# Patient Record
Sex: FEMALE | Race: BLACK OR AFRICAN AMERICAN | NOT HISPANIC OR LATINO | URBAN - METROPOLITAN AREA
[De-identification: names, ages, dates, MRNs, and addresses within clinical notes are randomized per-mention and may not be internally consistent; named-entity substitution may affect disease eponyms.]

---

## 2021-04-21 ENCOUNTER — EMERGENCY (EMERGENCY)
Facility: HOSPITAL | Age: 26
LOS: 1 days | Discharge: HOME | End: 2021-04-21
Attending: EMERGENCY MEDICINE | Admitting: EMERGENCY MEDICINE
Payer: COMMERCIAL

## 2021-04-21 VITALS
RESPIRATION RATE: 16 BRPM | DIASTOLIC BLOOD PRESSURE: 84 MMHG | TEMPERATURE: 98 F | OXYGEN SATURATION: 100 % | SYSTOLIC BLOOD PRESSURE: 145 MMHG | HEART RATE: 110 BPM

## 2021-04-21 DIAGNOSIS — K46.9 UNSPECIFIED ABDOMINAL HERNIA WITHOUT OBSTRUCTION OR GANGRENE: Chronic | ICD-10-CM

## 2021-04-21 PROCEDURE — 99283 EMERGENCY DEPT VISIT LOW MDM: CPT

## 2021-04-21 RX ORDER — SIMETHICONE 80 MG/1
80 TABLET, CHEWABLE ORAL ONCE
Refills: 0 | Status: COMPLETED | OUTPATIENT
Start: 2021-04-21 | End: 2021-04-21

## 2021-04-21 RX ADMIN — Medication 0.5 MILLIGRAM(S): at 23:07

## 2021-04-21 RX ADMIN — SIMETHICONE 80 MILLIGRAM(S): 80 TABLET, CHEWABLE ORAL at 23:08

## 2021-04-21 NOTE — ED PROVIDER NOTE - CLINICAL SUMMARY MEDICAL DECISION MAKING FREE TEXT BOX
26yoF with throat discomfort and mucus, no exam findings. After lengthy discussion it Is clear patient is anxious & felt better after talking, constant burping noticed. Pt plans to seek therapy. Denies SI, plan for now symethecon for burping and 0.5 adevan for anxiety. 26yoF with throat discomfort and mucus, no exam findings. After lengthy discussion it Is clear patient is anxious & felt better after talking, constant burping noticed. Pt plans to seek therapy. Denies SI, plan for now simethicone for burping and 0.5 ativan for anxiety. 26yoF with throat discomfort and mucous, no exam findings. After lengthy discussion it Is clear patient is anxious & felt better after talking, constant burping noticed. Pt plans to seek therapy. Denies SI, plan for now simethicone for burping and 0.5 ativan for anxiety.

## 2021-04-21 NOTE — ED PROVIDER NOTE - PATIENT PORTAL LINK FT
You can access the FollowMyHealth Patient Portal offered by [facility] by registering at the following website: [email or web address]. By joining [employer]’s FollowMyHealth portal, you will also be able to view your health information using other applications (apps) compatible with our system.

## 2021-04-21 NOTE — ED PROVIDER NOTE - NSFOLLOWUPCLINICS_GEN_ALL_ED_FT
[facility]  Behavioral Health  [address] [place] [address]  Phone: [phone]  Fax:

## 2021-04-21 NOTE — ED PROVIDER NOTE - NS_ ATTENDINGSCRIBEDETAILS _ED_A_ED_FT
I,Melia Kenyon, performed the initial face to face bedside interview with this patient regarding history of present illness, review of symptoms and relevant past medical, social and family history.  I completed an independent physical examination.  I was the initial provider who evaluated this patient. The history, relevant review of systems, past medical and surgical history, medical decision making, and physical examination was documented by the scribe in my presence and I attest to the accuracy of the documentation.

## 2021-04-21 NOTE — ED PROVIDER NOTE - NSFOLLOWUPINSTRUCTIONS_ED_ALL_ED_FT
Per our discussion, please follow up with the crisis center as needed. You may find a therapist on [email or web address] as well. Return to the emergency department if you have worsening symptoms, can't tolerate anything to eat or drink or new symptoms of concern.

## 2021-04-21 NOTE — ED ADULT TRIAGE NOTE - CHIEF COMPLAINT QUOTE
pt arrives c/o mucous to throat x 3 weeks, went to urg care and given decadron with no relief, no resp distress noted

## 2021-04-21 NOTE — ED PROVIDER NOTE - OBJECTIVE STATEMENT
26yof with pmhx of reflux presents to ED for eval for mucus in the throat and constant burping, pt also states she has anxiety. Her PCP prescribed her famotidine. Pt went to the UC today because she did not feel well, she received a shot of deckadron and benadryl, and came to the ED for further evaluation. Pt denies throat pain, fevers, chills, abdominal pain, urinary symptoms. Pt is anxious and tearfrom and states its due to her job, she says she hasn't been able to eat for 3 weeks for the fear of an allergic reaction; denies hx of allergic reaction. 26yof with pmhx of reflux presents to ED for eval for mucous in the throat and constant burping, pt also states she has anxiety. Her PCP prescribed her famotidine. Pt went to the UC today because she did not feel well, she received a shot of deckadron and benadryl, and came to the ED for further evaluation. Pt denies throat pain, fevers, chills, abdominal pain, urinary symptoms. Pt is anxious and tearful and states she has a lot of stress due to her job, she says she hasn't been able to eat for 3 weeks for the fear of an allergic reaction; denies hx of allergic reaction. Patient previously saw a GI doctor who recommended she try famotidine. She has an appointment with an allergist tomorrow.

## 2021-04-28 ENCOUNTER — INPATIENT (INPATIENT)
Facility: HOSPITAL | Age: 26
LOS: 1 days | Discharge: HOME | End: 2021-04-30
Attending: INTERNAL MEDICINE | Admitting: INTERNAL MEDICINE
Payer: COMMERCIAL

## 2021-04-28 VITALS
SYSTOLIC BLOOD PRESSURE: 122 MMHG | OXYGEN SATURATION: 100 % | RESPIRATION RATE: 18 BRPM | DIASTOLIC BLOOD PRESSURE: 61 MMHG | TEMPERATURE: 100 F | HEART RATE: 122 BPM

## 2021-04-28 DIAGNOSIS — K46.9 UNSPECIFIED ABDOMINAL HERNIA WITHOUT OBSTRUCTION OR GANGRENE: Chronic | ICD-10-CM

## 2021-04-28 LAB
ALBUMIN SERPL ELPH-MCNC: 4.2 G/DL — SIGNIFICANT CHANGE UP (ref 3.3–5)
ALP SERPL-CCNC: 53 U/L — SIGNIFICANT CHANGE UP (ref 40–120)
ALT FLD-CCNC: 9 U/L — SIGNIFICANT CHANGE UP (ref 4–33)
ANION GAP SERPL CALC-SCNC: 20 MMOL/L — HIGH (ref 7–14)
APPEARANCE UR: ABNORMAL
AST SERPL-CCNC: 9 U/L — SIGNIFICANT CHANGE UP (ref 4–32)
BACTERIA # UR AUTO: ABNORMAL
BASOPHILS # BLD AUTO: 0.05 K/UL — SIGNIFICANT CHANGE UP (ref 0–0.2)
BASOPHILS NFR BLD AUTO: 0.2 % — SIGNIFICANT CHANGE UP (ref 0–2)
BILIRUB SERPL-MCNC: 1.4 MG/DL — HIGH (ref 0.2–1.2)
BILIRUB UR-MCNC: NEGATIVE — SIGNIFICANT CHANGE UP
BLOOD GAS VENOUS COMPREHENSIVE RESULT: SIGNIFICANT CHANGE UP
BUN SERPL-MCNC: 15 MG/DL — SIGNIFICANT CHANGE UP (ref 7–23)
CALCIUM SERPL-MCNC: 9.1 MG/DL — SIGNIFICANT CHANGE UP (ref 8.4–10.5)
CHLORIDE SERPL-SCNC: 99 MMOL/L — SIGNIFICANT CHANGE UP (ref 98–107)
CO2 SERPL-SCNC: 18 MMOL/L — LOW (ref 22–31)
COLOR SPEC: ABNORMAL
CREAT SERPL-MCNC: 0.88 MG/DL — SIGNIFICANT CHANGE UP (ref 0.5–1.3)
DIFF PNL FLD: ABNORMAL
EOSINOPHIL # BLD AUTO: 0 K/UL — SIGNIFICANT CHANGE UP (ref 0–0.5)
EOSINOPHIL NFR BLD AUTO: 0 % — SIGNIFICANT CHANGE UP (ref 0–6)
EPI CELLS # UR: 2 /HPF — SIGNIFICANT CHANGE UP (ref 0–5)
GLUCOSE SERPL-MCNC: 74 MG/DL — SIGNIFICANT CHANGE UP (ref 70–99)
GLUCOSE UR QL: NEGATIVE — SIGNIFICANT CHANGE UP
HCT VFR BLD CALC: 33.9 % — LOW (ref 34.5–45)
HGB BLD-MCNC: 10.8 G/DL — LOW (ref 11.5–15.5)
IANC: 18.38 K/UL — HIGH (ref 1.5–8.5)
IMM GRANULOCYTES NFR BLD AUTO: 0.6 % — SIGNIFICANT CHANGE UP (ref 0–1.5)
KETONES UR-MCNC: ABNORMAL
LEUKOCYTE ESTERASE UR-ACNC: ABNORMAL
LIDOCAIN IGE QN: 45 U/L — SIGNIFICANT CHANGE UP (ref 7–60)
LYMPHOCYTES # BLD AUTO: 0.36 K/UL — LOW (ref 1–3.3)
LYMPHOCYTES # BLD AUTO: 1.8 % — LOW (ref 13–44)
MCHC RBC-ENTMCNC: 28.6 PG — SIGNIFICANT CHANGE UP (ref 27–34)
MCHC RBC-ENTMCNC: 31.9 GM/DL — LOW (ref 32–36)
MCV RBC AUTO: 89.9 FL — SIGNIFICANT CHANGE UP (ref 80–100)
MONOCYTES # BLD AUTO: 1.23 K/UL — HIGH (ref 0–0.9)
MONOCYTES NFR BLD AUTO: 6.1 % — SIGNIFICANT CHANGE UP (ref 2–14)
NEUTROPHILS # BLD AUTO: 18.38 K/UL — HIGH (ref 1.8–7.4)
NEUTROPHILS NFR BLD AUTO: 91.3 % — HIGH (ref 43–77)
NITRITE UR-MCNC: NEGATIVE — SIGNIFICANT CHANGE UP
NRBC # BLD: 0 /100 WBCS — SIGNIFICANT CHANGE UP
NRBC # FLD: 0 K/UL — SIGNIFICANT CHANGE UP
PH UR: 5.5 — SIGNIFICANT CHANGE UP (ref 5–8)
PLATELET # BLD AUTO: 223 K/UL — SIGNIFICANT CHANGE UP (ref 150–400)
POTASSIUM SERPL-MCNC: 3.8 MMOL/L — SIGNIFICANT CHANGE UP (ref 3.5–5.3)
POTASSIUM SERPL-SCNC: 3.8 MMOL/L — SIGNIFICANT CHANGE UP (ref 3.5–5.3)
PROT SERPL-MCNC: 6.9 G/DL — SIGNIFICANT CHANGE UP (ref 6–8.3)
PROT UR-MCNC: ABNORMAL
RBC # BLD: 3.77 M/UL — LOW (ref 3.8–5.2)
RBC # FLD: 14.7 % — HIGH (ref 10.3–14.5)
RBC CASTS # UR COMP ASSIST: >10 /HPF — HIGH (ref 0–4)
SARS-COV-2 RNA SPEC QL NAA+PROBE: SIGNIFICANT CHANGE UP
SODIUM SERPL-SCNC: 137 MMOL/L — SIGNIFICANT CHANGE UP (ref 135–145)
SP GR SPEC: 1.03 — HIGH (ref 1.01–1.02)
UROBILINOGEN FLD QL: SIGNIFICANT CHANGE UP
WBC # BLD: 20.14 K/UL — HIGH (ref 3.8–10.5)
WBC # FLD AUTO: 20.14 K/UL — HIGH (ref 3.8–10.5)
WBC UR QL: 5 /HPF — SIGNIFICANT CHANGE UP (ref 0–5)

## 2021-04-28 PROCEDURE — 74176 CT ABD & PELVIS W/O CONTRAST: CPT | Mod: 26

## 2021-04-28 PROCEDURE — 99285 EMERGENCY DEPT VISIT HI MDM: CPT

## 2021-04-28 PROCEDURE — 76700 US EXAM ABDOM COMPLETE: CPT | Mod: 26

## 2021-04-28 PROCEDURE — 71046 X-RAY EXAM CHEST 2 VIEWS: CPT | Mod: 26

## 2021-04-28 RX ORDER — FAMOTIDINE 10 MG/ML
20 INJECTION INTRAVENOUS DAILY
Refills: 0 | Status: DISCONTINUED | OUTPATIENT
Start: 2021-04-28 | End: 2021-04-28

## 2021-04-28 RX ORDER — SODIUM CHLORIDE 9 MG/ML
1000 INJECTION INTRAMUSCULAR; INTRAVENOUS; SUBCUTANEOUS ONCE
Refills: 0 | Status: COMPLETED | OUTPATIENT
Start: 2021-04-28 | End: 2021-04-28

## 2021-04-28 RX ORDER — PIPERACILLIN AND TAZOBACTAM 4; .5 G/20ML; G/20ML
3.38 INJECTION, POWDER, LYOPHILIZED, FOR SOLUTION INTRAVENOUS ONCE
Refills: 0 | Status: COMPLETED | OUTPATIENT
Start: 2021-04-28 | End: 2021-04-28

## 2021-04-28 RX ORDER — ACETAMINOPHEN 500 MG
975 TABLET ORAL ONCE
Refills: 0 | Status: COMPLETED | OUTPATIENT
Start: 2021-04-28 | End: 2021-04-28

## 2021-04-28 RX ADMIN — PIPERACILLIN AND TAZOBACTAM 200 GRAM(S): 4; .5 INJECTION, POWDER, LYOPHILIZED, FOR SOLUTION INTRAVENOUS at 17:38

## 2021-04-28 RX ADMIN — Medication 30 MILLILITER(S): at 16:30

## 2021-04-28 RX ADMIN — SODIUM CHLORIDE 1000 MILLILITER(S): 9 INJECTION INTRAMUSCULAR; INTRAVENOUS; SUBCUTANEOUS at 17:38

## 2021-04-28 RX ADMIN — SODIUM CHLORIDE 1000 MILLILITER(S): 9 INJECTION INTRAMUSCULAR; INTRAVENOUS; SUBCUTANEOUS at 16:22

## 2021-04-28 NOTE — ED PROVIDER NOTE - ATTENDING CONTRIBUTION TO CARE
DR. GARG, ATTENDING MD-  I performed a face to face bedside interview with the patient regarding history of present illness, review of symptoms and past medical history. I completed an independent physical exam.  I have discussed the patient's plan of care with the PA.   Documentation as above in the note.    27 y/o female h/o anemia, ing hernia repair here with abd pain s/p endoscopy this morning.  Pt is tachy, ttp epigastrium.  Eval for lyte abn, anemia, dehydration, pancreatitis, gb pathology, gastritis, pregnancy.  Obtain [facility] covid swab cbc cmp lipase ruq u/s give ivf gi cocktail reassess.

## 2021-04-28 NOTE — ED ADULT NURSE NOTE - OBJECTIVE STATEMENT
Pt with co epigastric pain with nausea  not eating well. pt given maalox ,pt refuse pepcid no vomiting noted. fluids given awaiting dipo.

## 2021-04-28 NOTE — ED PROVIDER NOTE - EYES, MLM
Discussed sleep hygiene. Recommended sleep meditation.   Clear bilaterally, pupils equal, round and reactive to light.

## 2021-04-28 NOTE — ED PROVIDER NOTE - PROGRESS NOTE DETAILS
ADRIENNE:  Patient signed out to me by Dr. Hammonds pending ctap.  Patient declining iv contrast, reporting family history of IV contrast allergy.  Patient denies personal history of IV contrast allergy.  I explained to patient that a CT without contrast could miss clinically important pathology such as infection or inflammatory pathology that may require surgery and may place the patient at risk of serious illness. Patient acknowledged risk and was not altered or intoxicated appearing.  Will proceed with non contrast ct of abdomen per patient's wishes. DELEON:  CT negative, although limited due to no iv contrast.  Patient labs c/w starvation ketoacidosis.  Will admit for leukocytosis and supportive care.

## 2021-04-28 NOTE — ED PROVIDER NOTE - CLINICAL SUMMARY MEDICAL DECISION MAKING FREE TEXT BOX
27 Y/O F PMH Anemia never transfused C/O limited PO intake for the past 7 days now presents with nausea and lightheadedness. Pt states she had an endoscopy today and has been feeling bloated after. Plan is labs to eval for anemia or electrolyte disturbance and a lipase to eval for pancreatitis. Will check an upright CXR to R/O free air, low clinical suspicion for perforation. No signs of acute distress.

## 2021-04-28 NOTE — ED PROVIDER NOTE - OBJECTIVE STATEMENT
27 Y/O F PMH Anemia never transfused C/O limited PO intake for the past 7 days now presents with nausea and lightheadedness. Pt states she had an endoscopy today and has been feeling bloated after. Pt states she was previously lightheaded but feels better after fluids (1L with EMS). Pt states she had a hiatal hernia and gastritis which was found on today's endoscopy. Pt states she took heartburn medication in the past without improvement. PSH R inguinal hernia repair in infancy. Pt denies fever, chills, nightsweats or any other symptoms or acute complaints.

## 2021-04-29 DIAGNOSIS — R10.9 UNSPECIFIED ABDOMINAL PAIN: ICD-10-CM

## 2021-04-29 DIAGNOSIS — R63.0 ANOREXIA: ICD-10-CM

## 2021-04-29 DIAGNOSIS — R14.0 ABDOMINAL DISTENSION (GASEOUS): ICD-10-CM

## 2021-04-29 DIAGNOSIS — K21.9 GASTRO-ESOPHAGEAL REFLUX DISEASE WITHOUT ESOPHAGITIS: ICD-10-CM

## 2021-04-29 DIAGNOSIS — D72.829 ELEVATED WHITE BLOOD CELL COUNT, UNSPECIFIED: ICD-10-CM

## 2021-04-29 DIAGNOSIS — Z98.890 OTHER SPECIFIED POSTPROCEDURAL STATES: Chronic | ICD-10-CM

## 2021-04-29 DIAGNOSIS — D64.9 ANEMIA, UNSPECIFIED: ICD-10-CM

## 2021-04-29 DIAGNOSIS — E86.0 DEHYDRATION: ICD-10-CM

## 2021-04-29 DIAGNOSIS — E87.2 ACIDOSIS: ICD-10-CM

## 2021-04-29 LAB
24R-OH-CALCIDIOL SERPL-MCNC: 13.6 NG/ML — LOW (ref 30–80)
A1C WITH ESTIMATED AVERAGE GLUCOSE RESULT: 4.7 % — SIGNIFICANT CHANGE UP (ref 4–5.6)
ALBUMIN SERPL ELPH-MCNC: 3.7 G/DL — SIGNIFICANT CHANGE UP (ref 3.3–5)
ALP SERPL-CCNC: 46 U/L — SIGNIFICANT CHANGE UP (ref 40–120)
ALT FLD-CCNC: 5 U/L — SIGNIFICANT CHANGE UP (ref 4–33)
ANION GAP SERPL CALC-SCNC: 14 MMOL/L — SIGNIFICANT CHANGE UP (ref 7–14)
APAP SERPL-MCNC: <15 UG/ML — SIGNIFICANT CHANGE UP (ref 15–25)
AST SERPL-CCNC: 7 U/L — SIGNIFICANT CHANGE UP (ref 4–32)
BASE EXCESS BLDV CALC-SCNC: -3.3 MMOL/L — LOW (ref -3–2)
BASOPHILS # BLD AUTO: 0.09 K/UL — SIGNIFICANT CHANGE UP (ref 0–0.2)
BASOPHILS NFR BLD AUTO: 0.6 % — SIGNIFICANT CHANGE UP (ref 0–2)
BILIRUB SERPL-MCNC: 1.3 MG/DL — HIGH (ref 0.2–1.2)
BLOOD GAS VENOUS - CREATININE: 0.8 MG/DL — SIGNIFICANT CHANGE UP (ref 0.5–1.3)
BLOOD GAS VENOUS COMPREHENSIVE RESULT: SIGNIFICANT CHANGE UP
BUN SERPL-MCNC: 9 MG/DL — SIGNIFICANT CHANGE UP (ref 7–23)
CALCIUM SERPL-MCNC: 8.8 MG/DL — SIGNIFICANT CHANGE UP (ref 8.4–10.5)
CHLORIDE BLDV-SCNC: 107 MMOL/L — SIGNIFICANT CHANGE UP (ref 96–108)
CHLORIDE SERPL-SCNC: 103 MMOL/L — SIGNIFICANT CHANGE UP (ref 98–107)
CO2 SERPL-SCNC: 19 MMOL/L — LOW (ref 22–31)
CREAT SERPL-MCNC: 0.71 MG/DL — SIGNIFICANT CHANGE UP (ref 0.5–1.3)
EOSINOPHIL # BLD AUTO: 0.2 K/UL — SIGNIFICANT CHANGE UP (ref 0–0.5)
EOSINOPHIL NFR BLD AUTO: 1.4 % — SIGNIFICANT CHANGE UP (ref 0–6)
ESTIMATED AVERAGE GLUCOSE: 88 MG/DL — SIGNIFICANT CHANGE UP (ref 68–114)
ETHANOL SERPL-MCNC: <10 MG/DL — SIGNIFICANT CHANGE UP
GAS PNL BLDV: 134 MMOL/L — LOW (ref 136–146)
GLUCOSE BLDV-MCNC: 103 MG/DL — HIGH (ref 70–99)
GLUCOSE SERPL-MCNC: 101 MG/DL — HIGH (ref 70–99)
HCO3 BLDV-SCNC: 21 MMOL/L — SIGNIFICANT CHANGE UP (ref 20–27)
HCT VFR BLD CALC: 29 % — LOW (ref 34.5–45)
HCT VFR BLDA CALC: 29.5 % — LOW (ref 34.5–46.5)
HGB BLD CALC-MCNC: 9.5 G/DL — LOW (ref 11.5–15.5)
HGB BLD-MCNC: 9.3 G/DL — LOW (ref 11.5–15.5)
IANC: 11.84 K/UL — HIGH (ref 1.5–8.5)
IMM GRANULOCYTES NFR BLD AUTO: 0.5 % — SIGNIFICANT CHANGE UP (ref 0–1.5)
LACTATE BLDV-MCNC: 1.5 MMOL/L — SIGNIFICANT CHANGE UP (ref 0.5–2)
LYMPHOCYTES # BLD AUTO: 1.55 K/UL — SIGNIFICANT CHANGE UP (ref 1–3.3)
LYMPHOCYTES # BLD AUTO: 10.5 % — LOW (ref 13–44)
MAGNESIUM SERPL-MCNC: 2 MG/DL — SIGNIFICANT CHANGE UP (ref 1.6–2.6)
MCHC RBC-ENTMCNC: 28.9 PG — SIGNIFICANT CHANGE UP (ref 27–34)
MCHC RBC-ENTMCNC: 32.1 GM/DL — SIGNIFICANT CHANGE UP (ref 32–36)
MCV RBC AUTO: 90.1 FL — SIGNIFICANT CHANGE UP (ref 80–100)
MONOCYTES # BLD AUTO: 0.98 K/UL — HIGH (ref 0–0.9)
MONOCYTES NFR BLD AUTO: 6.6 % — SIGNIFICANT CHANGE UP (ref 2–14)
NEUTROPHILS # BLD AUTO: 11.84 K/UL — HIGH (ref 1.8–7.4)
NEUTROPHILS NFR BLD AUTO: 80.4 % — HIGH (ref 43–77)
NRBC # BLD: 0 /100 WBCS — SIGNIFICANT CHANGE UP
NRBC # FLD: 0 K/UL — SIGNIFICANT CHANGE UP
PCO2 BLDV: 41 MMHG — SIGNIFICANT CHANGE UP (ref 41–51)
PH BLDV: 7.34 — SIGNIFICANT CHANGE UP (ref 7.32–7.43)
PHOSPHATE SERPL-MCNC: 3.2 MG/DL — SIGNIFICANT CHANGE UP (ref 2.5–4.5)
PLATELET # BLD AUTO: 232 K/UL — SIGNIFICANT CHANGE UP (ref 150–400)
PO2 BLDV: 39 MMHG — SIGNIFICANT CHANGE UP (ref 35–40)
POTASSIUM BLDV-SCNC: 3.4 MMOL/L — SIGNIFICANT CHANGE UP (ref 3.4–4.5)
POTASSIUM SERPL-MCNC: 3.5 MMOL/L — SIGNIFICANT CHANGE UP (ref 3.5–5.3)
POTASSIUM SERPL-SCNC: 3.5 MMOL/L — SIGNIFICANT CHANGE UP (ref 3.5–5.3)
PROT SERPL-MCNC: 6.1 G/DL — SIGNIFICANT CHANGE UP (ref 6–8.3)
RBC # BLD: 3.22 M/UL — LOW (ref 3.8–5.2)
RBC # FLD: 14.7 % — HIGH (ref 10.3–14.5)
SALICYLATES SERPL-MCNC: <5 MG/DL — LOW (ref 15–30)
SAO2 % BLDV: 66.3 % — SIGNIFICANT CHANGE UP (ref 60–85)
SODIUM SERPL-SCNC: 136 MMOL/L — SIGNIFICANT CHANGE UP (ref 135–145)
TOXICOLOGY SCREEN, DRUGS OF ABUSE, SERUM RESULT: SIGNIFICANT CHANGE UP
TSH SERPL-MCNC: 1.26 UIU/ML — SIGNIFICANT CHANGE UP (ref 0.27–4.2)
WBC # BLD: 14.74 K/UL — HIGH (ref 3.8–10.5)
WBC # FLD AUTO: 14.74 K/UL — HIGH (ref 3.8–10.5)

## 2021-04-29 PROCEDURE — 99254 IP/OBS CNSLTJ NEW/EST MOD 60: CPT

## 2021-04-29 PROCEDURE — 99223 1ST HOSP IP/OBS HIGH 75: CPT

## 2021-04-29 PROCEDURE — 99253 IP/OBS CNSLTJ NEW/EST LOW 45: CPT

## 2021-04-29 RX ORDER — SODIUM CHLORIDE 9 MG/ML
1000 INJECTION, SOLUTION INTRAVENOUS ONCE
Refills: 0 | Status: COMPLETED | OUTPATIENT
Start: 2021-04-29 | End: 2021-04-29

## 2021-04-29 RX ORDER — SODIUM CHLORIDE 9 MG/ML
1000 INJECTION INTRAMUSCULAR; INTRAVENOUS; SUBCUTANEOUS ONCE
Refills: 0 | Status: DISCONTINUED | OUTPATIENT
Start: 2021-04-29 | End: 2021-04-29

## 2021-04-29 RX ORDER — PANTOPRAZOLE SODIUM 20 MG/1
40 TABLET, DELAYED RELEASE ORAL DAILY
Refills: 0 | Status: DISCONTINUED | OUTPATIENT
Start: 2021-04-29 | End: 2021-04-29

## 2021-04-29 RX ORDER — SODIUM CHLORIDE 9 MG/ML
1000 INJECTION, SOLUTION INTRAVENOUS ONCE
Refills: 0 | Status: DISCONTINUED | OUTPATIENT
Start: 2021-04-29 | End: 2021-04-29

## 2021-04-29 RX ORDER — SUCRALFATE 1 G
1 TABLET ORAL
Refills: 0 | Status: DISCONTINUED | OUTPATIENT
Start: 2021-04-29 | End: 2021-04-30

## 2021-04-29 RX ORDER — ONDANSETRON 8 MG/1
4 TABLET, FILM COATED ORAL EVERY 6 HOURS
Refills: 0 | Status: DISCONTINUED | OUTPATIENT
Start: 2021-04-29 | End: 2021-04-30

## 2021-04-29 RX ORDER — SODIUM CHLORIDE 9 MG/ML
1000 INJECTION, SOLUTION INTRAVENOUS
Refills: 0 | Status: DISCONTINUED | OUTPATIENT
Start: 2021-04-29 | End: 2021-04-30

## 2021-04-29 RX ORDER — CHOLECALCIFEROL (VITAMIN D3) 125 MCG
1000 CAPSULE ORAL DAILY
Refills: 0 | Status: DISCONTINUED | OUTPATIENT
Start: 2021-04-29 | End: 2021-04-30

## 2021-04-29 RX ADMIN — SODIUM CHLORIDE 125 MILLILITER(S): 9 INJECTION, SOLUTION INTRAVENOUS at 11:15

## 2021-04-29 RX ADMIN — SODIUM CHLORIDE 125 MILLILITER(S): 9 INJECTION, SOLUTION INTRAVENOUS at 02:20

## 2021-04-29 RX ADMIN — SODIUM CHLORIDE 1000 MILLILITER(S): 9 INJECTION, SOLUTION INTRAVENOUS at 07:05

## 2021-04-29 RX ADMIN — Medication 1 GRAM(S): at 11:15

## 2021-04-29 NOTE — PROGRESS NOTE ADULT - PROBLEM SELECTOR PLAN 6
- onset after EGD earlier in the day of coming to the ED; has significant burping and flatulence  - s/p Maalox in the ED which made it worse

## 2021-04-29 NOTE — CONSULT NOTE ADULT - ASSESSMENT
This is a 25 y/o woman with a medical history of GERD on prn PPI who presented to the ED yesterday with c/o anorexia and abdominal bloating and dysphagia post EGD done earlier in the day.  Since admission and aggressive medical care, pt has been able to tolerate a soft diet in addition to tolerating liquids with minimal symptoms.    -Continue excellent medical care  -No acute surgical intervention warranted at this time  -Pt should follow-up with her Gastroenterologist for ongoing evaluation and symptom management as well as further work-up of her hiatal hernia  -Pt should follow-up with Dr. Uribe in 2-3 weeks after discharge for follow-up  -D/w Dr. Uribe    [id number]

## 2021-04-29 NOTE — H&P ADULT - NSHPREVIEWOFSYSTEMS_GEN_ALL_CORE
REVIEW OF SYSTEMS:    CONSTITUTIONAL: Mild weakness due to dehydration.  No fever, chills or sweating  EYES/ENT: Dysphagia to solids - in the setting of worsening GERD and newly diagnosed moderate hiatal hernia.  Oral dryness.  No visual changes.  NECK: No pain or stiffness  RESPIRATORY: No cough or hemoptysis.  No shortness of breath  CARDIOVASCULAR: No chest pain or palpitation.  No lower extremity edema  GASTROINTESTINAL: Newly diagnosed moderate hiatal hernia.  Worsening acid reflux.  Nausea with one episode of non-bloody vomiting.  No epigastric or abdominal pain. No diarrhea or constipation. No melena or hematochezia.  GENITOURINARY: No dysuria, frequency or hematuria  MUSCULOSKELETAL: No joint pain, swelling, decreased ROM, erythema, warmth  NEUROLOGICAL: No numbness or weakness  PSYCHIATRY: No anxiety, or depression.  SKIN: No itching, burning, rashes, or lesions   All other review of systems is negative unless indicated above. REVIEW OF SYSTEMS:    CONSTITUTIONAL: Mild weakness due to dehydration.  No fever, chills or sweating  EYES/ENT: Dysphagia to solids - in the setting of worsening GERD and newly diagnosed moderate hiatal hernia.  Oral dryness.  No visual changes.  NECK: No pain or stiffness  RESPIRATORY: No cough or hemoptysis.  No shortness of breath  CARDIOVASCULAR: No chest pain or palpitation.  No lower extremity edema  GASTROINTESTINAL: Newly diagnosed moderate hiatal hernia.  Worsening acid reflux.  Significant burping and flatulence.  Nausea with one episode of non-bloody vomiting.  No epigastric or abdominal pain. No diarrhea or constipation. No melena or hematochezia.  GENITOURINARY: No dysuria, frequency or hematuria  MUSCULOSKELETAL: No joint pain, swelling, decreased ROM, erythema, warmth  NEUROLOGICAL: No numbness or weakness  PSYCHIATRY: No anxiety, or depression.  SKIN: No itching, burning, rashes, or lesions   All other review of systems is negative unless indicated above.

## 2021-04-29 NOTE — H&P ADULT - PROBLEM SELECTOR PLAN 6
- Hgb = 10.8  - no sign of occult bleeding  - likely due to deficient oral intake, and possibly menstrual loss  - would get anemia profile  - may need to start iron supplement when PO tolerant  - f/u hemoglobin trend - onset after EGD earlier in the day of coming to the ED; has significant burping and flatulence  - s/p Maalox in the ED  - continuing Maalox Q6 hours for now

## 2021-04-29 NOTE — PROGRESS NOTE ADULT - PROBLEM SELECTOR PLAN 1
- PO intolerance (chiefly of solids) for over one month, in the setting of worsening GERD-type symptoms, and EGD earlier in the day of coming to the ED  - s/p 2 liters NaCl in the ED and continued on D5-1/2 NS at 125 mL/Hr.  Still appears dehydrated.  Additional 1 liter bolus of LR and continued on maintenance IVF  - soft diet, advance as tolerated

## 2021-04-29 NOTE — H&P ADULT - PROBLEM SELECTOR PLAN 2
- now w/ significant intolerance to solid intake; requires significant chewing prior to swallowing.  - onset of GERD-type symptoms on 03/31 after dining at a vegan restaurant  - EGD earlier in the day of coming to the ED significant for moderate size hiatus hernia with erythema  - starting diet at [facility], then advance, as tolerated  - f/u electrolytes  - patient is anxious/scared to eat - now w/ significant intolerance to solid intake; requires significant chewing prior to swallowing.  - onset of GERD-type symptoms on 03/31 after dining at a vegan restaurant  - has noted some weight loss  - EGD earlier in the day of coming to the ED significant for moderate size hiatus hernia with erythema  - starting diet at [facility], then advance, as tolerated  - f/u electrolytes  - patient is anxious/scared to eat

## 2021-04-29 NOTE — H&P ADULT - PROBLEM SELECTOR PLAN 4
- moderate size and with erythema; diagnosed on EGD earlier in the day - just prior to coming to the ED  - voices intolerance to PPIs and H-2 blockers; aggravates her anxiety  - prescribing sucralfate TID for now  - requesting Surgical Consult for eval of the hernia toward surgical correction; pls call Thoracic Surgery consult  - HOB elevation  - anti-GERD-type diet  - ensure optimal hydration (IVF fluid prescribed; eval need for ongoing hydration)  - encourage PO fluid intake - with respiratory compensation  - in the setting of deficient oral intake, dehydration  - being hydrated  - diet, as tolerated  - f/u repeat lab-work  - f/u for improvement - with respiratory compensation.  pH = 7.28, AG = 20, pCO2 = 39  - in the setting of deficient oral intake, dehydration  - being hydrated  - diet, as tolerated  - f/u repeat lab-work  - f/u for improvement

## 2021-04-29 NOTE — H&P ADULT - PROBLEM SELECTOR PLAN 5
- after EGD earlier in the day of coming to the ED  - s/p Maalox in the ED  - continuing Maalox Q6 hours for now - onset after EGD earlier in the day of coming to the ED; has significant burping and flatulence  - s/p Maalox in the ED  - continuing Maalox Q6 hours for now - WBC = 20.14  - low grade temp in the ED.  No chills, sweating  - likely due to stress demargination  - s/p zosyn in the ED; will not continue antibiotic for now  - f/u WBC trend  - f/u blood and urine cultures - WBC = 20.14  - low grade temp in the ED.  No chills, sweating  - concentrated UA with "many" bacteria, but patient asymptomatic  - likely due to stress demargination  - s/p zosyn in the ED; will not continue antibiotic for now  - f/u WBC trend  - f/u blood and urine cultures

## 2021-04-29 NOTE — H&P ADULT - HISTORY OF PRESENT ILLNESS
26 year old female, with past history of Anemia and Inguinal hernia repair (in infancy), presented to the ED secondary to bloating and abdominal pain since undergoing an EGD earlier in the day.  Seen and evaluated at bedside;    Vital signs upon ED presentation as follows: BP = 122/61, HR = 122, RR = 18, T = 37.7 C (99.8 F), O2 Sat = 100% on RA.  Diagnosed with Abdominal Pain.  Prescribed Maalox 30 mL x one, Tylenol 975 mg, NaCl 2 liters total bolus (continued on D5-1/2 NS at 125 mL/r) and zosyn in the ED. 26 year old female, with past history of Anemia and Inguinal hernia repair (in infancy), presented to the ED secondary to bloating and abdominal pain since undergoing an EGD earlier in the day.  Seen and evaluated at bedside; ill, weak appearing, but in NAD.  Patient reports undergoing an EGD earlier in the day, but had significant bloating thereafter.  EGD was done because of persistent and worsening acid reflux since eating at a vegan restaurant on her birthday; March [birth year].  Along w/ the acid reflux, patient developed intolerance to solid intake, but was able to drink water.  Tried several OTC medications (including TUMS, Gas-X) without relief.  Came to the ED for evaluation earlier in the month and received famotidine, which aggravated her anxiety (PMD had to prescribe a ?one time dose of anxiolytic).  EGD demonstrated a "moderate sized hiatus hernia with erythema."  The remainder of the study was unremarkable and biopsy was taken (results of biopsy expected next week).  Since the onset of gastritis, patient has lost some weight (~ 3 pounds over the past month) and became very concerned with the lack of adequate hydration.  Consequently, along with the post procedural bloating, patient decided to come to the ED for evaluation.  States no abdominal pain; just significant bloating with eructations and flatus.  Experienced nausea with one episode of vomiting.  No report of fever, chills, shortness of breath, chest pain, diarrhea, constipation, dysuria.    Patient reports intolerance of PPIs and H-2 blockers.    Vital signs upon ED presentation as follows: BP = 122/61, HR = 122, RR = 18, T = 37.7 C (99.8 F), O2 Sat = 100% on RA.  Diagnosed with Abdominal Pain.  Prescribed Maalox 30 mL x one, Tylenol 975 mg, NaCl 2 liters total bolus (continued on D5-1/2 NS at 125 mL/r) and zosyn in the ED.

## 2021-04-29 NOTE — H&P ADULT - PROBLEM SELECTOR PLAN 7
- Hgb = 10.8  - no sign of occult bleeding  - likely due to deficient oral intake, and possibly menstrual loss  - would get anemia profile  - may need to start iron supplement when PO tolerant  - f/u hemoglobin trend

## 2021-04-29 NOTE — H&P ADULT - ASSESSMENT
[ x ]  Lab studies reviewed  [ x ]  Radiology reviewed  [  ]  Old records reviewed    26 year old female, with past history of Anemia and Inguinal hernia repair (in infancy), presented to the ED secondary to bloating and abdominal pain since undergoing an EGD earlier in the day.  Vital signs upon ED presentation as follows: BP = 122/61, HR = 122, RR = 18, T = 37.7 C (99.8 F), O2 Sat = 100% on RA.  Diagnosed with Abdominal Pain.  Admitted for evaluation/management of: [ x ]  Lab studies reviewed  [ x ]  Radiology reviewed  [  ]  Old records reviewed    26 year old female, with past history of Anemia and Inguinal hernia repair (in infancy), presented to the ED secondary to bloating and abdominal pain since undergoing an EGD earlier in the day.  Vital signs upon ED presentation as follows: BP = 122/61, HR = 122, RR = 18, T = 37.7 C (99.8 F), O2 Sat = 100% on RA.  Diagnosed with Dehydration, Anorexia, Hiatal hernia with GERD, Metabolic acidosis, Abdominal bloating...  Admitted for evaluation/management of:

## 2021-04-29 NOTE — H&P ADULT - NSICDXPASTMEDICALHX_GEN_ALL_CORE_FT
PAST MEDICAL HISTORY:  Anemia     No pertinent past medical history      PAST MEDICAL HISTORY:  Acid reflux     Anemia      PAST MEDICAL HISTORY:  Acid reflux     Anemia     Anxiety

## 2021-04-29 NOTE — CONSULT NOTE ADULT - SUBJECTIVE AND OBJECTIVE BOX
HPI:  26 year old female, with past history of Anemia and Inguinal hernia repair (in infancy), presented to the ED secondary to bloating and abdominal pain since undergoing an EGD earlier in the day.  Seen and evaluated at bedside; ill, weak appearing, but in NAD.  Patient reports undergoing an EGD earlier in the day, but had significant bloating thereafter.  EGD was done because of persistent and worsening acid reflux since eating at a vegan restaurant on her birthday; .  Along w/ the acid reflux, patient developed intolerance to solid intake, but was able to drink water.  Tried several OTC medications (including TUMS, Gas-X) without relief.  Came to the ED for evaluation earlier in the month and received famotidine, which aggravated her anxiety (PMD had to prescribe a ?one time dose of anxiolytic).  EGD demonstrated a "moderate sized hiatus hernia with erythema."  The remainder of the study was unremarkable and biopsy was taken (results of biopsy expected next week).  Since the onset of gastritis, patient has lost some weight (~ 3 pounds over the past month) and became very concerned with the lack of adequate hydration.  Consequently, along with the post procedural bloating, patient decided to come to the ED for evaluation.  States no abdominal pain; just significant bloating with eructations and flatus.  Experienced nausea with one episode of vomiting.  No report of fever, chills, shortness of breath, chest pain, diarrhea, constipation, dysuria.    Patient reports intolerance of PPIs and H-2 blockers.    Vital signs upon ED presentation as follows: BP = 122/61, HR = 122, RR = 18, T = 37.7 C (99.8 F), O2 Sat = 100% on RA.  Diagnosed with Abdominal Pain.  Prescribed Maalox 30 mL x one, Tylenol 975 mg, NaCl 2 liters total bolus (continued on D5-1/2 NS at 125 mL/r) and zosyn in the ED. (2021 02:59)    Since admission pt has tolerated liquids and a soft diet with marked improvement in her symptoms and overall affect.  Thoracic surgery consulted for evaluation of hiatal hernia on outside study.   No Known Allergies      PAST MEDICAL & SURGICAL HISTORY:  Anemia    Acid reflux    Anxiety    H/O right inguinal hernia repair          Home Medications:  Tums episodically    Social History:  ETOH: socially  TOB: denies  Illicits: denies  Work/Home: in between jobs, lives with her mom     FAMILY HISTORY:  Family history of hypertension (Grandparent)    Family history of breast cancer (Mother)        REVIEW OF SYSTEMS:    A ten point review of systems was otherwise negative.          MEDICATIONS  (STANDING):  cholecalciferol 1000 Unit(s) Oral daily  dextrose 5% + sodium chloride 0.45%. 1000 milliLiter(s) (125 mL/Hr) IV Continuous <Continuous>  sucralfate suspension 1 Gram(s) Oral four times a day    MEDICATIONS  (PRN):  aluminum hydroxide/magnesium hydroxide/simethicone Suspension 30 milliLiter(s) Oral every 6 hours PRN Dyspepsia  ondansetron Injectable 4 milliGRAM(s) IV Push every 6 hours PRN Nausea        Vital Signs Last 24 Hrs  T(C): 36.7 (2021 12:39), Max: 37.1 (2021 17:45)  T(F): 98.1 (2021 12:39), Max: 98.7 (2021 17:45)  HR: 72 (2021 12:39) (72 - 98)  BP: 112/67 (2021 12:39) (110/71 - 116/72)  BP(mean): --  RR: 17 (2021 12:39) (16 - 18)  SpO2: 100% (2021 12:39) (100% - 100%)    General: NAD, Pleasant  Neurology: Patient is AA&Ox4, follows commands, and speech fluent  Neck: Neck supple, trachea midline, No JVD  Respiratory: CTA B/L, (-)rales, rhonchi  CV: S1S2, r/r/r, (-)m/r/g  Abdomen: S/NT/ND, BSx4  Extremities: 2+ peripheral pulses bilat throughout; (-)edema appreciated  Skin: No Rashes, Hematoma, Ecchymosis    LABS:                        9.3    14.74 )-----------( 232      ( 2021 07:04 )             29.0     04-    136  |  103  |  9   ----------------------------<  101<H>  3.5   |  19<L>  |  0.71    Ca    8.8      2021 07:04  Phos  3.2       Mg     2.0         TPro  6.1  /  Alb  3.7  /  TBili  1.3<H>  /  DBili  x   /  AST  7   /  ALT  5   /  AlkPhos  46        Urinalysis Basic - ( 2021 20:25 )    Color: Brown / Appearance: Slightly Turbid / S.029 / pH: x  Gluc: x / Ketone: Large  / Bili: Negative / Urobili: <2 mg/dL   Blood: x / Protein: 100 mg/dL / Nitrite: Negative   Leuk Esterase: Small / RBC: >10 /HPF / WBC 5 /HPF   Sq Epi: x / Non Sq Epi: 2 /HPF / Bacteria: Many        RADIOLOGY & ADDITIONAL STUDIES:  CT Abdomen and Pelvis No Cont (21 @ 22:40) >  FINDINGS:    LOWER CHEST: Left lower and middle lobe linear atelectasis.    LIVER: Within normal limits.  BILE DUCTS: Normal caliber.  GALLBLADDER: Within normal limits.  SPLEEN: Within normal limits.  PANCREAS: Within normal limits.  ADRENALS: Within normal limits.  KIDNEYS/URETERS:Within normal limits.    BLADDER: Within normal limits.  REPRODUCTIVE ORGANS: Uterus and adnexa within normal limits.    BOWEL: No bowel obstruction or inflammation. Appendix is not visualized. No evidence of inflammation in the pericecal region.  PERITONEUM: No evidence of pneumoperitoneum. Trace pelvic free fluid likely physiologic.  VESSELS: Within normal limits.  RETROPERITONEUM/LYMPH NODES: No lymphadenopathy.  ABDOMINAL WALL: Within normal limits.  BONES: Within normal limits.    IMPRESSION:  No bowel obstruction or inflammation.

## 2021-04-29 NOTE — PROGRESS NOTE ADULT - PROBLEM SELECTOR PLAN 2
- now w/ significant intolerance to solid intake; requires significant chewing prior to swallowing.  - onset of GERD-type symptoms/globus sensation on 03/31 after dining at a vegan restaurant  - has noted some weight loss  - EGD earlier in the day of coming to the ED significant for moderate size hiatus hernia with erythema  - patient is anxious/scared to eat  - concern for possible eosinophilic esophagitis given can precisely time start of symptoms to single meal - will have to f/u EGD biopsies - now w/ significant intolerance to solid intake; requires significant chewing prior to swallowing.  - onset of GERD-type symptoms/globus sensation on 03/31 after dining at a vegan restaurant  - has noted some weight loss  - EGD earlier in the day of coming to the ED significant for moderate size hiatal hernia with erythema  - patient is anxious/scared to eat  - concern for possible eosinophilic esophagitis given can precisely time start of symptoms to single meal - will have to f/u EGD biopsies

## 2021-04-29 NOTE — H&P ADULT - REASON FOR ADMISSION
Abdominal pain Abdominal bloating, Dehydration, Anorexia, Hiatal hernia Dehydration, Anorexia, Hiatal hernia with GERD, Metabolic acidosis, Abdominal bloating

## 2021-04-29 NOTE — H&P ADULT - NSHPSOCIALHISTORY_GEN_ALL_CORE
SOCIAL HISTORY:    Marital Status:  (  )    (  ) Single        (  )        (  )        (  )   Occupation:   Lives with:        (  ) Alone       (  ) Spouse      (  ) Children        (  ) Parents           (  ) Other    No history of smoking  No history of alcohol abuse  No history of illegal drug use SOCIAL HISTORY:    Marital Status:  (  )    ( x ) Single        (  )        (  )        (  )   Occupation:   Lives with:        (  ) Alone       (  ) Spouse      (  ) Children        ( x ) Parents           ( x ) Other/Grandmother    No history of smoking  No history of alcohol abuse  No history of illegal drug use

## 2021-04-29 NOTE — PROGRESS NOTE ADULT - PROBLEM SELECTOR PLAN 7
- Hgb = 10.8  - no sign of occult bleeding  - likely due to deficient oral intake, and possibly menstrual loss  - f/u anemia protocol  - hgb decrease in AM likely diulational  - no signs of active bleeding

## 2021-04-29 NOTE — CONSULT NOTE ADULT - ATTENDING COMMENTS
Patient seen and examined agree with above note as modified, where appropriate, by me. Pt with boating and dysphagia who was found to have a small hiatal hernia on EGD not evident on CT c/w possible sliding hernia. Question if her sx are related to hernia or other etiology. Pt will need further outpatient work up. Pt to follow up as outpt.

## 2021-04-29 NOTE — PROGRESS NOTE ADULT - PROBLEM SELECTOR PLAN 3
- moderate size and with erythema.  Likely congenital.  Diagnosed on EGD earlier in the day - just prior to coming to the ED  - voices intolerance to PPIs and H-2 blockers; aggravates her anxiety  - prescribing sucralfate TID for now  - requesting surgical consult for eval of the hernia toward surgical correction  - HOB elevation  - anti-GERD-type diet  - ensure optimal hydration (IVF fluid prescribed; eval need for ongoing hydration)  - encourage PO fluid intake  - if no improvement on sucralfate, may need GI input

## 2021-04-29 NOTE — H&P ADULT - PROBLEM SELECTOR PLAN 3
- with respiratory compensation  - in the setting of deficient oral intake, dehydration  - being hydrated  - diet, as tolerated  - f/u for improvement - moderate size and with erythema.  Likely congenital.  Diagnosed on EGD earlier in the day - just prior to coming to the ED  - voices intolerance to PPIs and H-2 blockers; aggravates her anxiety (had to receive short course of anxiolytic from PMD after famotidine in the ED recently)  - prescribing sucralfate TID for now  - requesting surgical consult for eval of the hernia toward surgical correction; pls call Thoracic Surgery consult  - HOB elevation  - anti-GERD-type diet  - ensure optimal hydration (IVF fluid prescribed; eval need for ongoing hydration)  - encourage PO fluid intake  - if no improvement on sucralfate, may need GI input

## 2021-04-29 NOTE — PROGRESS NOTE ADULT - ASSESSMENT
26 year old female, with past history of Anemia and Inguinal hernia repair (in infancy), presented to the ED secondary to bloating and abdominal pain since undergoing an EGD earlier in the day by Dr Keyes. Admitted for severe dehydration 2/2 decreased PO intake from hiatal hernia complicated by prerenal BERYL and mild acidosis. 26 year old female, with past history of Anemia and Inguinal hernia repair (in infancy), presented to the ED secondary to bloating and abdominal pain since undergoing an EGD earlier in the day by Dr Keyes. Admitted for severe dehydration 2/2 decreased PO intake from hiatal hernia complicated by prerenal BERYL and mild acidosis.

## 2021-04-29 NOTE — H&P ADULT - PROBLEM SELECTOR PLAN 1
- very dry oral mucosa, dry skin, appears a weak  - PO intolerance (chiefly of solids) for over one month, in the setting of worsening GERD-type symptoms, and EGD earlier in the day of coming to the ED  - s/p 2 liters NaCl in the ED and continued on D5-1/2 NS at 125 mL/Hr.  Still appears dehydrated.  Additional 1 liter bolus of LR and continuing on maintenance IVF  - f/u electrolytes, urine output  - diet, as tolerated - very dry oral mucosa, dry skin, appears a weak  - lab-work hemoconcentrated.  Moderate ketonuria  - no impairment of renal function  - PO intolerance (chiefly of solids) for over one month, in the setting of worsening GERD-type symptoms, and EGD earlier in the day of coming to the ED  - s/p 2 liters NaCl in the ED and continued on D5-1/2 NS at 125 mL/Hr.  Still appears dehydrated.  Additional 1 liter bolus of LR and continuing on maintenance IVF  - f/u electrolytes, urine output  - diet, as tolerated

## 2021-04-29 NOTE — PROGRESS NOTE ADULT - SUBJECTIVE AND OBJECTIVE BOX
*******************************************************  Khanh Clement MD PGY-1  Internal Medicine  Pager ([facility]) [phone] / (UC[facility]) [id number]  *******************************************************  Patient is a 26y old  Female who presents with a chief complaint of Dehydration, Anorexia, Hiatal hernia with GERD, Metabolic acidosis, Abdominal bloating (2021 02:59)        SUBJECTIVE / OVERNIGHT EVENTS:  - No acute events overnight.   - Patient seen and evaluated at bedside.  - Endorses feeling much better after IVF. Still with dysphagia to solids.  - ROS: Denies fevers/chills, headache, SOB at rest, cough, chest pain, palpitations, abdominal pain, nausea/vomiting/diarrhea/constipation, melena/hematochezia, dysuria, and hematuria    ------------------------------------------------------------------------------------------------------------    MEDICATIONS  (STANDING):  cholecalciferol 1000 Unit(s) Oral daily  dextrose 5% + sodium chloride 0.45%. 1000 milliLiter(s) (125 mL/Hr) IV Continuous <Continuous>  sucralfate suspension 1 Gram(s) Oral four times a day    MEDICATIONS  (PRN):  aluminum hydroxide/magnesium hydroxide/simethicone Suspension 30 milliLiter(s) Oral every 6 hours PRN Dyspepsia  ondansetron Injectable 4 milliGRAM(s) IV Push every 6 hours PRN Nausea      ------------------------------------------------------------------------------------------------------------    OBJECTIVE:    CAPILLARY BLOOD GLUCOSE        I&O's Summary    Daily Height in cm: 167.64 (2021 05:24)    Daily   Weight (kg): 53.524 (21 @ 05:24)    PHYSICAL EXAM:  T(F): 98.1, Max: 99.8 (21 @ 15:07)  HR: 72 (72 - 122)  BP: 112/67 (110/71 - 122/61)  RR: 17 (16 - 18)  SpO2: 100% (100% - 100%)      CONSTITUTIONAL: NAD, well-developed  HEENT: NCAT, EOMI, PERRLA, no scleral icterus, MMM  RESPIRATORY/CHEST: Normal respiratory effort; lungs are clear to auscultation bilaterally; no wheezing/crackles  CARDIO: Regular rate, normal S1 and S2, no murmur/rub/gallop; No JVD  VASCULAR: No lower extremity edema; Peripheral pulses are 2+ bilaterally; Capillary refill brisk  ABDOMEN: Soft, nontender to palpation, normoactive bowel sounds, no rebound/guarding; No hepatosplenomegaly  MUSCULOSKELETAL: no joint swelling or tenderness to palpation, full strength all extremities.  EXTREMITIES: hands/feet are warm, and without cyanosis or clubbing  SKIN: no visible rashes, pallor, diaphoresis, or jaundice  NEURO: No focal deficits; moving all extremities  PSYCH: A+O to person, place, and time; affect appropriate; cooperative    ------------------------------------------------------------------------------------------------------------  LABS:                        9.3    14.74 )-----------( 232      ( 2021 07:04 )             29.0         136  |  103  |  9   ----------------------------<  101<H>  3.5   |  19<L>  |  0.71    Ca    8.8      2021 07:04  Phos  3.2       Mg     2.0         TPro  6.1  /  Alb  3.7  /  TBili  1.3<H>  /  DBili  x   /  AST  7   /  ALT  5   /  AlkPhos  46            Urinalysis Basic - ( 2021 20:25 )    Color: Brown / Appearance: Slightly Turbid / S.029 / pH: x  Gluc: x / Ketone: Large  / Bili: Negative / Urobili: <2 mg/dL   Blood: x / Protein: 100 mg/dL / Nitrite: Negative   Leuk Esterase: Small / RBC: >10 /HPF / WBC 5 /HPF   Sq Epi: x / Non Sq Epi: 2 /HPF / Bacteria: Many          RADIOLOGY & ADDITIONAL TESTS:  Results Reviewed:     Imaging Personally Reviewed:  Electrocardiogram Personally Reviewed:      COORDINATION OF CARE:  Care discussed with consultants/other providers and notes reviewed [Y]:   ------------------------------------------------------------------------------------------------------------

## 2021-04-29 NOTE — H&P ADULT - NSHPLABSRESULTS_GEN_ALL_CORE
LAB-WORK/STUDIES:                          10.8   .14 )-----------( 223      ( 2021 16:25 )             33.9     2021 16:25    137    |  99     |  15     ----------------------------<  74     3.8     |  18     |  0.88     Ca    9.1        2021 16:25    TPro  6.9    /  Alb  4.2    /  TBili  1.4    /  DBili  x      /  AST  9      /  ALT  9      /  AlkPhos  53     2021 16:25    LIVER FUNCTIONS - ( 2021 16:25 )  Alb: 4.2 g/dL / Pro: 6.9 g/dL / ALK PHOS: 53 U/L / ALT: 9 U/L / AST: 9 U/L / GGT: x           CAPILLARY BLOOD GLUCOSE        Urinalysis Basic - ( 2021 20:25 )    Color: Brown / Appearance: Slightly Turbid / S.029 / pH: x  Gluc: x / Ketone: Large  / Bili: Negative / Urobili: <2 mg/dL   Blood: x / Protein: 100 mg/dL / Nitrite: Negative   Leuk Esterase: Small / RBC: >10 /HPF / WBC 5 /HPF   Sq Epi: x / Non Sq Epi: 2 /HPF / Bacteria: Many    ======================================================      < from: CT Abdomen and Pelvis No Cont (21 @ 22:40) >    EXAM:  CT ABDOMEN AND PELVIS        PROCEDURE DATE:  2021       INTERPRETATION:  CLINICAL INFORMATION: Epigastric pain and tenderness status post endoscopy today.    COMPARISON: None.    CONTRAST/COMPLICATIONS:  IV Contrast: None  Evaluation of the visceral organs is limited without intravenous contrast  Oral Contrast: None  Complications: None    PROCEDURE:  CT of the Abdomen and Pelvis was performed.  Sagittal and coronal reformats were performed.    FINDINGS:    LOWER CHEST: Left lower and middle lobe linear atelectasis.    LIVER: Within normal limits.  BILE DUCTS: Normal caliber.  GALLBLADDER: Within normal limits.  SPLEEN: Within normal limits.  PANCREAS: Within normal limits.  ADRENALS: Within normal limits.  KIDNEYS/URETERS:Within normal limits.    BLADDER: Within normal limits.  REPRODUCTIVE ORGANS: Uterus and adnexa within normal limits.    BOWEL: No bowel obstruction or inflammation. Appendix is not visualized. No evidence of inflammation in the pericecal region.  PERITONEUM: No evidence of pneumoperitoneum. Trace pelvic free fluid likely physiologic.  VESSELS: Within normal limits.  RETROPERITONEUM/LYMPH NODES: No lymphadenopathy.  ABDOMINAL WALL: Within normal limits.  BONES: Within normal limits.    IMPRESSION:  No bowel obstruction or inflammation.        ALVARO HODGSON M.D., RADIOLOGY RESIDENT  This document has been electronically signed.  EMIR TOTH MD; Attending Radiologist  This document has been electronically signed. 2021  1:23AM    < end of copied text >

## 2021-04-29 NOTE — H&P ADULT - NSICDXFAMILYHX_GEN_ALL_CORE_FT
FAMILY HISTORY:  No pertinent family history in first degree relatives     FAMILY HISTORY:  Mother  Still living? Unknown  Family history of breast cancer, Age at diagnosis: Age Unknown    Grandparent  Still living? Unknown  Family history of hypertension, Age at diagnosis: Age Unknown

## 2021-04-29 NOTE — H&P ADULT - NSHPPHYSICALEXAM_GEN_ALL_CORE
Vital Signs Last 24 Hrs  T(C): 36.7 (29 Apr 2021 02:02), Max: 37.7 (28 Apr 2021 15:07)  T(F): 98.1 (29 Apr 2021 02:02), Max: 99.8 (28 Apr 2021 15:07)  HR: 85 (29 Apr 2021 02:02) (85 - 122)  BP: 116/72 (29 Apr 2021 02:02) (110/71 - 122/61)  BP(mean): --  RR: 18 (29 Apr 2021 02:02) (16 - 18)  SpO2: 100% (29 Apr 2021 02:02) (100% - 100%)    ========================================================      PHYSICAL EXAMINATION:    APPEARANCE: Adequately groomed, adequately nourished.  NAD	  HEENT: Moist oral mucosa, PERRL, EOMI	  LYMPHATIC: No lymphadenopathy appreciated  CARDIOVASCULAR: (+) S1 S2.  No JVD.  No murmurs.  No edema  RESPIRATORY: No wheezing, rhonchi, crackles appreciated  GASTROINTESTINAL:  Soft, Non-tender, (+) BS  GENITOURINARY: No suprapubic tenderness.  No CVA tenderness B/L  SKIN: No rashes. No ecchymoses.  No cyanosis  PSYCHIATRIC: A&O x 3.  Mood & affect appropriate to situation  NEUROLOGICAL: Non-focal, MCCARTHY x 4 against gravity  EXTREMITIES: Normal range of motion.  No clubbing, cyanosis or edema  VASCULAR: Peripheral pulses palpable Vital Signs Last 24 Hrs  T(C): 36.7 (29 Apr 2021 02:02), Max: 37.7 (28 Apr 2021 15:07)  T(F): 98.1 (29 Apr 2021 02:02), Max: 99.8 (28 Apr 2021 15:07)  HR: 85 (29 Apr 2021 02:02) (85 - 122)  BP: 116/72 (29 Apr 2021 02:02) (110/71 - 122/61)  BP(mean): --  RR: 18 (29 Apr 2021 02:02) (16 - 18)  SpO2: 100% (29 Apr 2021 02:02) (100% - 100%)    ========================================================      PHYSICAL EXAMINATION:    APPEARANCE: Adequately groomed, thin and dehydrated female, lying in stretcher with HOB elevated ~ 90 degrees in NAD	  HEENT: Very dry oral mucosa.  PERRL, EOMI	  LYMPHATIC: No lymphadenopathy appreciated  CARDIOVASCULAR: (+) S1 S2.  No JVD.  No murmurs.  No edema  RESPIRATORY: No wheezing, rhonchi, crackles appreciated  GASTROINTESTINAL:  Soft, Non-tender, (+) BS  GENITOURINARY: No suprapubic tenderness.  No CVA tenderness B/L  SKIN: No rashes. No ecchymoses.  No cyanosis  PSYCHIATRIC: A&O x 3.  Mood & affect appropriate to situation  NEUROLOGICAL: Non-focal, MCCARTHY x 4 against gravity  EXTREMITIES: Normal range of motion.  No clubbing, cyanosis or edema  VASCULAR: Peripheral pulses palpable

## 2021-04-30 ENCOUNTER — TRANSCRIPTION ENCOUNTER (OUTPATIENT)
Age: 26
End: 2021-04-30

## 2021-04-30 VITALS
HEART RATE: 92 BPM | DIASTOLIC BLOOD PRESSURE: 84 MMHG | TEMPERATURE: 98 F | OXYGEN SATURATION: 100 % | SYSTOLIC BLOOD PRESSURE: 125 MMHG | RESPIRATION RATE: 17 BRPM

## 2021-04-30 LAB
ANION GAP SERPL CALC-SCNC: 8 MMOL/L — SIGNIFICANT CHANGE UP (ref 7–14)
APTT BLD: 32.5 SEC — SIGNIFICANT CHANGE UP (ref 27–36.3)
BLD GP AB SCN SERPL QL: NEGATIVE — SIGNIFICANT CHANGE UP
BUN SERPL-MCNC: 3 MG/DL — LOW (ref 7–23)
CALCIUM SERPL-MCNC: 8.6 MG/DL — SIGNIFICANT CHANGE UP (ref 8.4–10.5)
CHLORIDE SERPL-SCNC: 109 MMOL/L — HIGH (ref 98–107)
CO2 SERPL-SCNC: 24 MMOL/L — SIGNIFICANT CHANGE UP (ref 22–31)
COVID-19 SPIKE DOMAIN AB INTERP: NEGATIVE — SIGNIFICANT CHANGE UP
COVID-19 SPIKE DOMAIN ANTIBODY RESULT: 0.4 U/ML — SIGNIFICANT CHANGE UP
CREAT SERPL-MCNC: 0.65 MG/DL — SIGNIFICANT CHANGE UP (ref 0.5–1.3)
FERRITIN SERPL-MCNC: 36 NG/ML — SIGNIFICANT CHANGE UP (ref 15–150)
GLUCOSE SERPL-MCNC: 95 MG/DL — SIGNIFICANT CHANGE UP (ref 70–99)
HCT VFR BLD CALC: 27.8 % — LOW (ref 34.5–45)
HGB BLD-MCNC: 8.9 G/DL — LOW (ref 11.5–15.5)
INR BLD: 1.18 RATIO — HIGH (ref 0.88–1.16)
IRON SATN MFR SERPL: 18 UG/DL — LOW (ref 30–160)
IRON SATN MFR SERPL: 8 % — LOW (ref 14–50)
LDH SERPL L TO P-CCNC: 207 U/L — SIGNIFICANT CHANGE UP (ref 135–225)
MAGNESIUM SERPL-MCNC: 2 MG/DL — SIGNIFICANT CHANGE UP (ref 1.6–2.6)
MCHC RBC-ENTMCNC: 28.6 PG — SIGNIFICANT CHANGE UP (ref 27–34)
MCHC RBC-ENTMCNC: 32 GM/DL — SIGNIFICANT CHANGE UP (ref 32–36)
MCV RBC AUTO: 89.4 FL — SIGNIFICANT CHANGE UP (ref 80–100)
NRBC # BLD: 0 /100 WBCS — SIGNIFICANT CHANGE UP
NRBC # FLD: 0 K/UL — SIGNIFICANT CHANGE UP
PHOSPHATE SERPL-MCNC: 2.9 MG/DL — SIGNIFICANT CHANGE UP (ref 2.5–4.5)
PLATELET # BLD AUTO: 204 K/UL — SIGNIFICANT CHANGE UP (ref 150–400)
POTASSIUM SERPL-MCNC: 3.6 MMOL/L — SIGNIFICANT CHANGE UP (ref 3.5–5.3)
POTASSIUM SERPL-SCNC: 3.6 MMOL/L — SIGNIFICANT CHANGE UP (ref 3.5–5.3)
PROTHROM AB SERPL-ACNC: 13.5 SEC — SIGNIFICANT CHANGE UP (ref 10.6–13.6)
RBC # BLD: 3.11 M/UL — LOW (ref 3.8–5.2)
RBC # FLD: 14.8 % — HIGH (ref 10.3–14.5)
RETICS #: 32.8 K/UL — SIGNIFICANT CHANGE UP (ref 25–125)
RETICS/RBC NFR: 1.1 % — SIGNIFICANT CHANGE UP (ref 0.5–2.5)
RH IG SCN BLD-IMP: POSITIVE — SIGNIFICANT CHANGE UP
SARS-COV-2 IGG+IGM SERPL QL IA: 0.4 U/ML — SIGNIFICANT CHANGE UP
SARS-COV-2 IGG+IGM SERPL QL IA: NEGATIVE — SIGNIFICANT CHANGE UP
SODIUM SERPL-SCNC: 141 MMOL/L — SIGNIFICANT CHANGE UP (ref 135–145)
TIBC SERPL-MCNC: 224 UG/DL — SIGNIFICANT CHANGE UP (ref 220–430)
TRANSFERRIN SERPL-MCNC: 190 MG/DL — LOW (ref 200–360)
UIBC SERPL-MCNC: 206 UG/DL — SIGNIFICANT CHANGE UP (ref 110–370)
VIT B12 SERPL-MCNC: 1252 PG/ML — HIGH (ref 200–900)
WBC # BLD: 5.31 K/UL — SIGNIFICANT CHANGE UP (ref 3.8–10.5)
WBC # FLD AUTO: 5.31 K/UL — SIGNIFICANT CHANGE UP (ref 3.8–10.5)

## 2021-04-30 PROCEDURE — 99239 HOSP IP/OBS DSCHRG MGMT >30: CPT | Mod: GC

## 2021-04-30 RX ORDER — FOLIC ACID 0.8 MG
1 TABLET ORAL
Qty: 0 | Refills: 0 | DISCHARGE
Start: 2021-04-30

## 2021-04-30 RX ORDER — FOLIC ACID 0.8 MG
1 TABLET ORAL DAILY
Refills: 0 | Status: DISCONTINUED | OUTPATIENT
Start: 2021-04-30 | End: 2021-04-30

## 2021-04-30 RX ORDER — CHOLECALCIFEROL (VITAMIN D3) 125 MCG
1000 CAPSULE ORAL
Qty: 0 | Refills: 0 | DISCHARGE
Start: 2021-04-30

## 2021-04-30 RX ORDER — PREGABALIN 225 MG/1
1 CAPSULE ORAL
Qty: 0 | Refills: 0 | DISCHARGE
Start: 2021-04-30

## 2021-04-30 RX ORDER — FERROUS SULFATE 325(65) MG
325 TABLET ORAL DAILY
Refills: 0 | Status: DISCONTINUED | OUTPATIENT
Start: 2021-04-30 | End: 2021-04-30

## 2021-04-30 RX ORDER — PREGABALIN 225 MG/1
1000 CAPSULE ORAL DAILY
Refills: 0 | Status: DISCONTINUED | OUTPATIENT
Start: 2021-04-30 | End: 2021-04-30

## 2021-04-30 RX ORDER — ONDANSETRON 8 MG/1
1 TABLET, FILM COATED ORAL
Qty: 120 | Refills: 0
Start: 2021-04-30 | End: 2021-05-29

## 2021-04-30 RX ORDER — FERROUS SULFATE 325(65) MG
1 TABLET ORAL
Qty: 0 | Refills: 0 | DISCHARGE
Start: 2021-04-30

## 2021-04-30 RX ADMIN — Medication 1000 UNIT(S): at 11:15

## 2021-04-30 RX ADMIN — PREGABALIN 1000 MICROGRAM(S): 225 CAPSULE ORAL at 11:15

## 2021-04-30 RX ADMIN — SODIUM CHLORIDE 125 MILLILITER(S): 9 INJECTION, SOLUTION INTRAVENOUS at 11:15

## 2021-04-30 RX ADMIN — Medication 325 MILLIGRAM(S): at 11:15

## 2021-04-30 RX ADMIN — Medication 1 GRAM(S): at 11:14

## 2021-04-30 RX ADMIN — Medication 1 MILLIGRAM(S): at 11:14

## 2021-04-30 NOTE — PROGRESS NOTE ADULT - SUBJECTIVE AND OBJECTIVE BOX
*******************************************************  Khanh Clement MD PGY-1  Internal Medicine  Pager ([facility]) [phone] / ([facility]SO) [phone]  *******************************************************  Patient is a 26y old  Female who presents with a chief complaint of Dehydration, Anorexia, Hiatal hernia with GERD, Metabolic acidosis, Abdominal bloating (2021 16:54)        INTERVAL EVENTS (since last progress note)  - per thoracic, patient has SLIDING hiatal hernia, no need for urgent intervention.  - patient started on pureed diet    SUBJECTIVE / OVERNIGHT EVENTS:  - No acute events overnight.   - Patient seen and evaluated at bedside.  - Reports feeling much better.   - ROS: Denies fevers/chills, headache, SOB at rest, cough, chest pain, palpitations, abdominal pain, nausea/vomiting/diarrhea/constipation, melena/hematochezia, dysuria, and hematuria    ------------------------------------------------------------------------------------------------------------    MEDICATIONS  (STANDING):  cholecalciferol 1000 Unit(s) Oral daily  dextrose 5% + sodium chloride 0.45%. 1000 milliLiter(s) (125 mL/Hr) IV Continuous <Continuous>  sucralfate suspension 1 Gram(s) Oral four times a day    MEDICATIONS  (PRN):  aluminum hydroxide/magnesium hydroxide/simethicone Suspension 30 milliLiter(s) Oral every 6 hours PRN Dyspepsia  ondansetron Injectable 4 milliGRAM(s) IV Push every 6 hours PRN Nausea      ------------------------------------------------------------------------------------------------------------    OBJECTIVE:    CAPILLARY BLOOD GLUCOSE        I&O's Summary    Daily     Daily   Weight (kg): 53.524 (21 @ 05:24)    PHYSICAL EXAM:  T(F): 98.1, Max: 98.1 (21 @ 12:39)  HR: 67 (64 - 72)  BP: 115/65 (108/70 - 115/65)  RR: 17 (17 - 18)  SpO2: 100% (100% - 100%)      CONSTITUTIONAL: NAD, well-developed  HEENT: NCAT, EOMI, PERRLA, no scleral icterus, MMM  RESPIRATORY/CHEST: Normal respiratory effort; lungs are clear to auscultation bilaterally; no wheezing/crackles  CARDIO: Regular rate, normal S1 and S2, no murmur/rub/gallop; No JVD  VASCULAR: No lower extremity edema; Peripheral pulses are 2+ bilaterally; Capillary refill brisk  ABDOMEN: Soft, nontender to palpation, normoactive bowel sounds, no rebound/guarding; No hepatosplenomegaly  MUSCULOSKELETAL: no joint swelling or tenderness to palpation, full strength all extremities.  EXTREMITIES: hands/feet are warm, and without cyanosis or clubbing  SKIN: no visible rashes, pallor, diaphoresis, or jaundice  NEURO: No focal deficits; moving all extremities  PSYCH: A+O to person, place, and time; affect appropriate; cooperative    ------------------------------------------------------------------------------------------------------------  LABS:                        8.9    5.31  )-----------( 204      ( 2021 06:37 )             27.8     04-30    141  |  109<H>  |  3<L>  ----------------------------<  95  3.6   |  24  |  0.65    Ca    8.6      2021 06:37  Phos  2.9       Mg     2.0         TPro  6.1  /  Alb  3.7  /  TBili  1.3<H>  /  DBili  x   /  AST  7   /  ALT  5   /  AlkPhos  46  0429    PT/INR - ( 2021 06:37 )   PT: 13.5 sec;   INR: 1.18 ratio         PTT - ( 2021 06:37 )  PTT:32.5 sec      Urinalysis Basic - ( 2021 20:25 )    Color: Brown / Appearance: Slightly Turbid / S.029 / pH: x  Gluc: x / Ketone: Large  / Bili: Negative / Urobili: <2 mg/dL   Blood: x / Protein: 100 mg/dL / Nitrite: Negative   Leuk Esterase: Small / RBC: >10 /HPF / WBC 5 /HPF   Sq Epi: x / Non Sq Epi: 2 /HPF / Bacteria: Many        Culture - Blood (collected 2021 23:06)  Source: .Blood Blood-Venous  Preliminary Report (2021 01:02):    No growth to date.    Culture - Blood (collected 2021 23:06)  Source: .Blood Blood-Peripheral  Preliminary Report (2021 01:02):    No growth to date.        RADIOLOGY & ADDITIONAL TESTS:  Results Reviewed:     Imaging Personally Reviewed:  Electrocardiogram Personally Reviewed:      COORDINATION OF CARE:  Care discussed with consultants/other providers and notes reviewed [Y]:   ------------------------------------------------------------------------------------------------------------     *******************************************************  Khanh Clement MD PGY-1  Internal Medicine  Pager ([facility]) [phone] / (QVK) [phone]  *******************************************************  Patient is a 26y old  Female who presents with a chief complaint of Dehydration, Anorexia, Hiatal hernia with GERD, Metabolic acidosis, Abdominal bloating (2021 16:54)        INTERVAL EVENTS (since last progress note)  - per thoracic, patient has SLIDING hiatal hernia, no need for urgent intervention.  - patient started on pureed diet    SUBJECTIVE / OVERNIGHT EVENTS:  - No acute events overnight.   - Patient seen and evaluated at bedside.  - Reports feeling much better.   - ROS: Denies fevers/chills, headache, SOB at rest, cough, chest pain, palpitations, abdominal pain, nausea/vomiting/diarrhea/constipation, melena/hematochezia, dysuria, and hematuria    ------------------------------------------------------------------------------------------------------------    MEDICATIONS  (STANDING):  cholecalciferol 1000 Unit(s) Oral daily  dextrose 5% + sodium chloride 0.45%. 1000 milliLiter(s) (125 mL/Hr) IV Continuous <Continuous>  sucralfate suspension 1 Gram(s) Oral four times a day    MEDICATIONS  (PRN):  aluminum hydroxide/magnesium hydroxide/simethicone Suspension 30 milliLiter(s) Oral every 6 hours PRN Dyspepsia  ondansetron Injectable 4 milliGRAM(s) IV Push every 6 hours PRN Nausea      ------------------------------------------------------------------------------------------------------------    OBJECTIVE:    CAPILLARY BLOOD GLUCOSE        I&O's Summary    Daily     Daily   Weight (kg): 53.524 (21 @ 05:24)    PHYSICAL EXAM:  T(F): 98.1, Max: 98.1 (21 @ 12:39)  HR: 67 (64 - 72)  BP: 115/65 (108/70 - 115/65)  RR: 17 (17 - 18)  SpO2: 100% (100% - 100%)      CONSTITUTIONAL: NAD, well-developed  HEENT: NCAT, EOMI, PERRLA, no scleral icterus, MMM  RESPIRATORY/CHEST: Normal respiratory effort; lungs are clear to auscultation bilaterally; no wheezing/crackles  CARDIO: Regular rate, normal S1 and S2, no murmur/rub/gallop; No JVD  VASCULAR: No lower extremity edema; Peripheral pulses are 2+ bilaterally; Capillary refill brisk  ABDOMEN: Soft, nontender to palpation, normoactive bowel sounds, no rebound/guarding; No hepatosplenomegaly  MUSCULOSKELETAL: no joint swelling or tenderness to palpation, full strength all extremities.  EXTREMITIES: hands/feet are warm, and without cyanosis or clubbing  SKIN: no visible rashes, pallor, diaphoresis, or jaundice  NEURO: No focal deficits; moving all extremities  PSYCH: A+O to person, place, and time; affect appropriate; cooperative    ------------------------------------------------------------------------------------------------------------  LABS:                        8.9    5.31  )-----------( 204      ( 2021 06:37 )             27.8     04-30    141  |  109<H>  |  3<L>  ----------------------------<  95  3.6   |  24  |  0.65    Ca    8.6      2021 06:37  Phos  2.9       Mg     2.0         TPro  6.1  /  Alb  3.7  /  TBili  1.3<H>  /  DBili  x   /  AST  7   /  ALT  5   /  AlkPhos  46  0429    PT/INR - ( 2021 06:37 )   PT: 13.5 sec;   INR: 1.18 ratio         PTT - ( 2021 06:37 )  PTT:32.5 sec      Urinalysis Basic - ( 2021 20:25 )    Color: Brown / Appearance: Slightly Turbid / S.029 / pH: x  Gluc: x / Ketone: Large  / Bili: Negative / Urobili: <2 mg/dL   Blood: x / Protein: 100 mg/dL / Nitrite: Negative   Leuk Esterase: Small / RBC: >10 /HPF / WBC 5 /HPF   Sq Epi: x / Non Sq Epi: 2 /HPF / Bacteria: Many        Culture - Blood (collected 2021 23:06)  Source: .Blood Blood-Venous  Preliminary Report (2021 01:02):    No growth to date.    Culture - Blood (collected 2021 23:06)  Source: .Blood Blood-Peripheral  Preliminary Report (2021 01:02):    No growth to date.        RADIOLOGY & ADDITIONAL TESTS:  Results Reviewed:     Imaging Personally Reviewed:  Electrocardiogram Personally Reviewed:      COORDINATION OF CARE:  Care discussed with consultants/other providers and notes reviewed [Y]:   ------------------------------------------------------------------------------------------------------------

## 2021-04-30 NOTE — PROGRESS NOTE ADULT - PROBLEM SELECTOR PLAN 2
- now w/ significant intolerance to solid intake; requires significant chewing prior to swallowing.  - onset of GERD-type symptoms/globus sensation on 03/31 after dining at a vegan restaurant  - has noted some weight loss  - EGD earlier in the day of coming to the ED significant for moderate size hiatal hernia with erythema  - patient is anxious/scared to eat  - concern for possible eosinophilic esophagitis given can precisely time start of symptoms to single meal - will have to f/u EGD biopsies

## 2021-04-30 NOTE — PROGRESS NOTE ADULT - PROBLEM SELECTOR PLAN 3
- moderate size and with erythema.  Likely congenital.  Diagnosed on EGD earlier in the day - just prior to coming to the ED  - voices intolerance to PPIs and H-2 blockers; aggravates her anxiety  - prescribing sucralfate TID for now  - requesting surgical consult for eval of the hernia toward surgical correction  - HOB elevation  - anti-GERD-type diet  - ensure optimal hydration (IVF fluid prescribed; eval need for ongoing hydration)  - encourage PO fluid intake  - if no improvement on sucralfate, may need GI input Per thoracic surgery appears to be sliding hiatal hernia (as it resolved on CT but is present on EGD)  -no acute surgical intervention  -f/u with outpatient GI  -f/u with Dr Uribe (thoracic) in 2-3 weeks

## 2021-04-30 NOTE — PROGRESS NOTE ADULT - PROBLEM SELECTOR PLAN 1
- PO intolerance (chiefly of solids) for over one month, in the setting of worsening GERD-type symptoms, and EGD earlier in the day of coming to the ED  - s/p 2 liters NaCl in the ED and continued on D5-1/2 NS at 125 mL/Hr.  Still appears dehydrated.  Additional 1 liter bolus of LR and continued on maintenance IVF  - soft diet, advance as tolerated Now resolved.  -c/w IVF while advancing diet  -pureed diet for now, AAT  -d/c when can tolerate stable PO regimen that can be replicated at home

## 2021-04-30 NOTE — DISCHARGE NOTE PROVIDER - NSDCCPCAREPLAN_GEN_ALL_CORE_FT
PRINCIPAL DISCHARGE DIAGNOSIS  Diagnosis: Dehydration  Assessment and Plan of Treatment: You came into the hospital for dehydration. You were treated with IV fluids and were transitioned to a pureed diet. If you find, when you get home, that you can't tolerate drinking water and you think that you're dehydrated please come back to the hospital.      SECONDARY DISCHARGE DIAGNOSES  Diagnosis: Normocytic anemia  Assessment and Plan of Treatment: You were found to be anemic during your hospital stay. We think that this is multifactorial. It seems that you have an iron deficiency (probably from not being able to eat foods and from blood loss during menses) and might also have a deficiency in vitamins B12 and folate. You should start taking iron, b12, and folate supplements and you should follow up with your doctors (primary care and GI) for further workup. Lastly, you should make sure that you're up to date on cancer screenings (like pap smears). It's unlikely that you have cervical cancer but it is important to rule it out.    Diagnosis: Sliding hiatal hernia  Assessment and Plan of Treatment: You have what appears to be a sliding hiatal hernia. You should follow up with your GI doc and Dr Uribe from thoracic surgery.

## 2021-04-30 NOTE — PROGRESS NOTE ADULT - ATTENDING COMMENTS
26 W h/o anemia presents with c/o reduced oral intake for several months. Found to be dehydrated with anion gap acidosis. Found to have hiatal hernia on EGD yesterday.    Patient states she is unable to swallow solid food and that she regurgitates food if she lies flat. She is tolerating liquid diet so far. Acidosis improving; likely starvation ketoacidosis.     Thoracic surgery consulted given severity of sx and hiatal hernia.
26 W h/o anemia presents with c/o reduced oral intake for several months. Found to be dehydrated with anion gap acidosis. Found to have hiatal hernia on EGD. Thoracic surgery recs reviewed, likely sliding hernia and no surgical intervention indicated.    Patient to trial pureed diet per her preference as she can replicate this diet at home. If tolerates, may be discharged.    Patient states she follows with her PMD and has known anemia for which she is supposed to take "vitamins". Advised patient to also f/u with her GI physician.     Currently patient is clinically euvolemic.    I spent 35 minutes counseling this patient and coordinating their discharge.

## 2021-04-30 NOTE — PROGRESS NOTE ADULT - ASSESSMENT
26 year old female, with past history of Anemia and Inguinal hernia repair (in infancy), presented to the ED secondary to bloating and abdominal pain since undergoing an EGD earlier in the day by Dr Keyes. Admitted for severe dehydration 2/2 decreased PO intake from hiatal hernia complicated by prerenal BERYL and mild acidosis.

## 2021-04-30 NOTE — DISCHARGE NOTE NURSING/CASE MANAGEMENT/SOCIAL WORK - PATIENT PORTAL LINK FT
No significant past surgical history You can access the FollowMyHealth Patient Portal offered by [facility] by registering at the following website: [email or web address]. By joining [employer] [facility]’s FollowMyHealth portal, you will also be able to view your health information using other applications (apps) compatible with our system.

## 2021-04-30 NOTE — DISCHARGE NOTE NURSING/CASE MANAGEMENT/SOCIAL WORK - NSTOBACCONEVERSMOKERY/N_GEN_A
Telephone Encounter by Jayna Warner RN at 04/26/18 01:17 PM     Author:  Jayna Warner RN Service:  (none) Author Type:  Registered Nurse     Filed:  04/26/18 01:18 PM Encounter Date:  4/25/2018 Status:  Signed     :  Jayna Warner RN (Registered Nurse)            Would you like to see patient or was there a next step in place if the zolpidem didn't work?[EV1.1M]       Revision History        User Key Date/Time User Provider Type Action    > EV1.1 04/26/18 01:18 PM Jayna Warner, RN Registered Nurse Sign    M - Manual             No

## 2021-04-30 NOTE — DISCHARGE NOTE PROVIDER - NSDCMRMEDTOKEN_GEN_ALL_CORE_FT
cholecalciferol oral tablet: 1000 unit(s) orally once a day  cyanocobalamin 1000 mcg oral tablet: 1 tab(s) orally once a day  ferrous sulfate 325 mg (65 mg elemental iron) oral tablet: 1 tab(s) orally once a day  folic acid 1 mg oral tablet: 1 tab(s) orally once a day

## 2021-04-30 NOTE — DISCHARGE NOTE PROVIDER - NSDCFUADDINST_GEN_ALL_CORE_FT
Please make an appointment to follow up with your primary care doctor within 1-2 weeks.    You should also follow up closely with your gastroenterologist for further workup of your hernia.     Please make an appointment to see Dr Uribe (number listed above) from thoracic surgery for further surgical evaluation.     You had some anemia, it is most likely a combination of menses, iron deficiency, and vitamin deficiencies but you should mention it to your primary care doc and GI doc. Additionally, you should make sure that you're up to date on routine cancer screenings including pap smears.

## 2021-04-30 NOTE — DISCHARGE NOTE PROVIDER - HOSPITAL COURSE
26 year old female, with past history of Anemia and Inguinal hernia repair (in infancy), presented to the ED secondary to bloating and abdominal pain since undergoing an EGD earlier in the day by Dr Keyes. Admitted for severe dehydration 2/2 decreased PO intake from hiatal hernia complicated by prerenal BERYL and mild acidosis.     Dehydration, BERYL, and acidosis rapidly corrected with IV fluids. Patient was able to tolerate a pureed diet. She was evaluated by thoracic surgery and determined to have a sliding hiatal hernia so no urgent intervention was done    vitamind D 26 year old female, with past history of Anemia and Inguinal hernia repair (in infancy), presented to the ED secondary to bloating and abdominal pain since undergoing an EGD earlier in the day by Dr Keyes. Admitted for severe dehydration 2/2 decreased PO intake from hiatal hernia complicated by prerenal BERYL and mild acidosis.     Dehydration, BERYL, and acidosis rapidly corrected with IV fluids. Patient was able to tolerate a pureed diet. She was evaluated by thoracic surgery and determined to have a sliding hiatal hernia so no urgent intervention was done and she was referred for outpatient follow up. She was also told to get a barium swallow as an outpatient.    She was found to have low vitamin D so she was started on supplementation. Her anemia appeared to have a component of iron deficiency so she was started on iron. Workup for megaloblastic anemia was ongoing but she was told to supplement with folate and B12 until she could complete the workup as an outpatient. She should also have a pap smear to rule out malignancy as contribution to anemia.    Patient was medically optimized, stable and ready for discharge. Plan of care and return precautions were discussed with the patient who verbally stated understanding. 26 year old W with past history of Anemia and Inguinal hernia repair (in infancy), presented to the ED secondary to bloating and abdominal pain since undergoing an EGD earlier in the day by Dr Keyes. Admitted for severe dehydration 2/2 decreased PO intake from hiatal hernia complicated by prerenal BERYL and mild acidosis.     Dehydration, BERYL, and acidosis rapidly corrected with IV fluids. Patient was able to tolerate a pureed diet. She was evaluated by thoracic surgery and determined to have a sliding hiatal hernia so no urgent intervention was done and she was referred for outpatient follow up. She was also told to get a barium swallow as an outpatient.    She was found to have low vitamin D so she was started on supplementation. Her anemia appeared to have a component of iron deficiency so she was started on iron. Workup for megaloblastic anemia was ongoing but she was told to supplement with folate and B12 until she could complete the workup as an outpatient. She should also have a pap smear to rule out malignancy as contribution to anemia.    Patient was medically optimized, stable and ready for discharge. Plan of care and return precautions were discussed with the patient who verbally stated understanding.

## 2021-04-30 NOTE — DISCHARGE NOTE PROVIDER - CARE PROVIDER_API CALL
Marvin Uribe)  Surgery; Thoracic Surgery  [phone] [place] [address], [place] [address]  Phone: [phone]  Fax: [phone]  Follow Up Time:

## 2021-04-30 NOTE — PROGRESS NOTE ADULT - PROBLEM SELECTOR PLAN 6
- onset after EGD earlier in the day of coming to the ED; has significant burping and flatulence  - s/p Maalox in the ED which made it worse - onset after EGD earlier in the day of coming to the ED  - likely related to insufflation

## 2021-04-30 NOTE — PROGRESS NOTE ADULT - REASON FOR ADMISSION
Dehydration, Anorexia, Hiatal hernia with GERD, Metabolic acidosis, Abdominal bloating
Dehydration, Anorexia, Hiatal hernia with GERD, Metabolic acidosis, Abdominal bloating

## 2021-05-04 LAB
CULTURE RESULTS: SIGNIFICANT CHANGE UP
CULTURE RESULTS: SIGNIFICANT CHANGE UP
SPECIMEN SOURCE: SIGNIFICANT CHANGE UP
SPECIMEN SOURCE: SIGNIFICANT CHANGE UP

## 2022-06-22 NOTE — ED PROVIDER NOTE - ENMT, MLM
June 27, 2022      Uvaldo Ascencio  [address] [place] [address]        Dear ,    We are writing to inform you of your test results.    Your liver function and kidney function are normal. Normal electrolytes. Normal glucose - no diabetes.       Your thyroid results are normal.      Your risk of heart disease based on the calculation below, which takes into account age and cholesterol, shows you have a 17% risk in the next 10 years of heart disease.       I would recommend a prescription for Lipitor 10 mg daily to reduce that risk.  I will send a prescription.  Let's recheck your labs in 6-8 weeks.     Resulted Orders   Comprehensive metabolic panel (BMP + Alb, Alk Phos, ALT, AST, Total. Bili, TP)   Result Value Ref Range    Sodium 141 133 - 144 mmol/L    Potassium 4.8 3.4 - 5.3 mmol/L    Chloride 108 94 - 109 mmol/L    Carbon Dioxide (CO2) 30 20 - 32 mmol/L    Anion Gap 3 3 - 14 mmol/L    Urea Nitrogen 14 7 - 30 mg/dL    Creatinine 1.03 0.66 - 1.25 mg/dL    Calcium 9.3 8.5 - 10.1 mg/dL    Glucose 98 70 - 99 mg/dL    Alkaline Phosphatase 52 40 - 150 U/L    AST 22 0 - 45 U/L    ALT 21 0 - 70 U/L    Protein Total 7.4 6.8 - 8.8 g/dL    Albumin 3.8 3.4 - 5.0 g/dL    Bilirubin Total 0.5 0.2 - 1.3 mg/dL    GFR Estimate 78 >60 mL/min/1.73m2      Comment:      Effective December 21, 2021 eGFRcr in adults is calculated using the 2021 CKD-EPI creatinine equation which includes age and gender (Gi et al., NEJM, DOI: 10.1056/JOUMry9447257)   TSH with free T4 reflex   Result Value Ref Range    TSH 1.06 0.40 - 4.00 mU/L   Lipid panel reflex to direct LDL Fasting   Result Value Ref Range    Cholesterol 221 (H) <200 mg/dL    Triglycerides 156 (H) <150 mg/dL    Direct Measure HDL 76 >=40 mg/dL    LDL Cholesterol Calculated 114 (H) <=100 mg/dL    Non HDL Cholesterol 145 (H) <130 mg/dL    Patient Fasting > 8hrs? Yes     Narrative    Cholesterol  Desirable:  <200 mg/dL    Triglycerides  Normal:  Less than 150  mg/dL  Borderline High:  150-199 mg/dL  High:  200-499 mg/dL  Very High:  Greater than or equal to 500 mg/dL    Direct Measure HDL  Female:  Greater than or equal to 50 mg/dL   Male:  Greater than or equal to 40 mg/dL    LDL Cholesterol  Desirable:  <100mg/dL  Above Desirable:  100-129 mg/dL   Borderline High:  130-159 mg/dL   High:  160-189 mg/dL   Very High:  >= 190 mg/dL    Non HDL Cholesterol  Desirable:  130 mg/dL  Above Desirable:  130-159 mg/dL  Borderline High:  160-189 mg/dL  High:  190-219 mg/dL  Very High:  Greater than or equal to 220 mg/dL   CBC with platelets and differential   Result Value Ref Range    WBC Count 6.8 4.0 - 11.0 10e3/uL    RBC Count 4.36 (L) 4.40 - 5.90 10e6/uL    Hemoglobin 13.3 13.3 - 17.7 g/dL    Hematocrit 41.0 40.0 - 53.0 %    MCV 94 78 - 100 fL    MCH 30.5 26.5 - 33.0 pg    MCHC 32.4 31.5 - 36.5 g/dL    RDW 13.2 10.0 - 15.0 %    Platelet Count 347 150 - 450 10e3/uL    % Neutrophils 55 %    % Lymphocytes 31 %    % Monocytes 10 %    % Eosinophils 3 %    % Basophils 0 %    % Immature Granulocytes 0 %    Absolute Neutrophils 3.8 1.6 - 8.3 10e3/uL    Absolute Lymphocytes 2.1 0.8 - 5.3 10e3/uL    Absolute Monocytes 0.7 0.0 - 1.3 10e3/uL    Absolute Eosinophils 0.2 0.0 - 0.7 10e3/uL    Absolute Basophils 0.0 0.0 - 0.2 10e3/uL    Absolute Immature Granulocytes 0.0 <=0.4 10e3/uL       If you have any questions or concerns, please call the clinic at the number listed above.       Sincerely,      Kathy Dickson MD                             Airway patent, Nasal mucosa clear. Mouth with normal mucosa. Throat has no vesicles, no oropharyngeal exudates and uvula is midline.

## 2022-09-04 NOTE — PROGRESS NOTE ADULT - PROBLEM SELECTOR PLAN 7
1-1.9 cm - Hgb = 10.8  - no sign of occult bleeding  - likely due to deficient oral intake, and possibly menstrual loss  - f/u anemia protocol  - hgb decrease in AM likely diulational  - no signs of active bleeding Normocytic with high RDW  -iron studies c/w AIMEE  -possibly also with B12 or folate deficiency  -start iron, b12, and folate as will not be able to complete inpatient workup  -f/u with GI  -recommend GYN f/u for routine cancer screening    Diet: pureed, AAT  DVT ppx: none - patient is ambulatory

## 2023-02-13 NOTE — ED PROVIDER NOTE - CARDIAC, MLM
Partially impaired: cannot see medication labels or newsprint, but can see obstacles in path, and the surrounding layout; can count fingers at arm's length
Normal rate, regular rhythm.  Heart sounds S1, S2.  No murmurs, rubs or gallops.

## 2023-03-29 NOTE — ED PROVIDER NOTE - NS_EDPROVIDERDISPOUSERTYPE_ED_A_ED
250 Scribe Attestation (For Scribes USE Only)... Attending Attestation (For Attendings USE Only).../Scribe Attestation (For Scribes USE Only)...

## 2024-02-29 ENCOUNTER — EMERGENCY (EMERGENCY)
Facility: HOSPITAL | Age: 29
LOS: 1 days | Discharge: HOME | End: 2024-02-29
Attending: STUDENT IN AN ORGANIZED HEALTH CARE EDUCATION/TRAINING PROGRAM | Admitting: EMERGENCY MEDICINE
Payer: COMMERCIAL

## 2024-02-29 VITALS
HEART RATE: 100 BPM | TEMPERATURE: 98 F | RESPIRATION RATE: 16 BRPM | SYSTOLIC BLOOD PRESSURE: 132 MMHG | DIASTOLIC BLOOD PRESSURE: 83 MMHG | OXYGEN SATURATION: 97 %

## 2024-02-29 VITALS
DIASTOLIC BLOOD PRESSURE: 78 MMHG | RESPIRATION RATE: 17 BRPM | TEMPERATURE: 98 F | HEART RATE: 81 BPM | OXYGEN SATURATION: 100 % | SYSTOLIC BLOOD PRESSURE: 122 MMHG

## 2024-02-29 DIAGNOSIS — Z98.890 OTHER SPECIFIED POSTPROCEDURAL STATES: Chronic | ICD-10-CM

## 2024-02-29 PROCEDURE — 99284 EMERGENCY DEPT VISIT MOD MDM: CPT

## 2024-02-29 RX ORDER — FAMOTIDINE 10 MG/ML
20 INJECTION INTRAVENOUS ONCE
Refills: 0 | Status: COMPLETED | OUTPATIENT
Start: 2024-02-29 | End: 2024-02-29

## 2024-02-29 RX ORDER — SODIUM CHLORIDE 9 MG/ML
1000 INJECTION INTRAMUSCULAR; INTRAVENOUS; SUBCUTANEOUS ONCE
Refills: 0 | Status: COMPLETED | OUTPATIENT
Start: 2024-02-29 | End: 2024-02-29

## 2024-02-29 RX ORDER — METOCLOPRAMIDE HCL 10 MG
10 TABLET ORAL ONCE
Refills: 0 | Status: COMPLETED | OUTPATIENT
Start: 2024-02-29 | End: 2024-02-29

## 2024-02-29 NOTE — ED PROVIDER NOTE - CLINICAL SUMMARY MEDICAL DECISION MAKING FREE TEXT BOX
Abdifatah GOODWIN:   Exam vitals are stable nontoxic-appearing physical exam as above, DDx concern for hyperemesis in setting of pregnancy, abdomen soft and nontender low concern for acute surgical or obstetric pathology, given constellation of symptoms we will check basic labs give antiemetics give fluids hCG urine transvaginal ultrasound for baby check, give additional meds as needed reassess consider CDU/OB/GYN consult as indicated.

## 2024-02-29 NOTE — ED PROVIDER NOTE - OBJECTIVE STATEMENT
Abdifatah GOODWIN: 28-year-old female no reported medical history 10 weeks pregnant with confirmed IUP followed by OB, history of prior surgical hernia repair as a child presents with a chief complaint of repeated episodes of nausea vomiting was going on the last week or so getting worse, recently seen by OB was started on likely just however not improving.  No chest pain no trouble breathing notes mild upper abdominal discomfort in setting of repeated bouts of vomiting no urinary or bowel complaints feels like is passing gas.  No rashes.  No unusual vaginal discharge or bleeding. Abdifatah GOODWIN: 28-year-old female no reported medical history 10 weeks pregnant with confirmed IUP followed by OB, history of prior surgical hernia repair as a child presents with a chief complaint of repeated episodes of nausea vomiting was going on the last week or so getting worse, recently seen by OB was started on Diclegis just however not improving.  No chest pain no trouble breathing notes mild upper abdominal discomfort in setting of repeated bouts of vomiting no urinary or bowel complaints feels like is passing gas.  No rashes.  No unusual vaginal discharge or bleeding.

## 2024-02-29 NOTE — ED PROVIDER NOTE - PHYSICAL EXAMINATION
Abdifatah GOODWIN:  VITALS: Initial triage and subsequent vitals have been reviewed by me.  GEN APPEARANCE: Alert, cooperative. Non-toxic appearing. Well appearing. NAD.  HEAD: Atraumatic, normocephalic   EYES: PERRLa, EOMI, vision grossly intact.   EARS: Gross hearing intact.   NOSE: No nasal discharge, no external evidence of epistaxis.   NECK: Supple  CV: RRR, S1S2, no c/r/m/g. No cyanosis. Extremities warm, well perfused. Cap refill <2 seconds. No bruits.   LUNGS: CTAB. No wheezing. No rales. No rhonchi. No diminished breath sounds.   ABDOMEN: Soft, NTND. No guarding or rebound. No masses.   MSK/EXT: Spine appears normal, no spine point tenderness. No CVA ttp. Normal muscular development. Pelvis stable. No obvious joint or bony deformity, no peripheral edema.   NEURO: Alert, follows commands. Weight bearing normal. Speech normal. Sensation and motor normal x4 extremities.   SKIN: Normal color for race, warm, dry and intact. No evidence of rash.  PSYCH: Normal mood and affect.

## 2024-02-29 NOTE — ED PROVIDER NOTE - NSFOLLOWUPINSTRUCTIONS_ED_ALL_ED_FT
[facility] Gynecology and Obstetrics  Gynceology/OB  [address] [place] [address]  Phone: [phone]  Fax:   Follow Up Time:    Hyperemesis Gravidarum    WHAT YOU NEED TO KNOW:    Hyperemesis gravidarum is a severe form of nausea and vomiting that happens during pregnancy. Hyperemesis is more severe than morning sickness. It may cause you to have nausea or vomiting all day for many days. It may also keep you from getting enough food and liquid.    DISCHARGE INSTRUCTIONS:    Seek care immediately if:    You have signs of severe dehydration including little to no urine and dry mouth or lips.    You have severe stomach pain.    You feel too weak or dizzy to stand up.    You see blood in your vomit or bowel movements.  Contact your healthcare provider if:    You cannot keep any food or liquid down.    You are losing weight.    You have a fever.    You have questions or concerns about your condition or care.  Medicines:    Medicines, vitamins, or supplements may be given to help decrease nausea and vomiting.    Take your medicine as directed. Contact your healthcare provider if you think your medicine is not helping or if you have side effects. Tell him of her if you are allergic to any medicine. Keep a list of the medicines, vitamins, and herbs you take. Include the amounts, and when and why you take them. Bring the list or the pill bottles to follow-up visits. Carry your medicine list with you in case of an emergency.  Manage your symptoms:    Eat small amounts of food every 1 to 2 hours. Some examples of good foods to eat include broth, toast, fruit, eggs, gelatin, or cottage cheese. Do not eat spicy or high-fat foods. Try to eat crackers before getting out of bed each morning. Foods and drinks with ginger, such as ginger ale, may help to decrease nausea and vomiting.    Drink liquids as directed. You may need to drink small amounts of liquid often to prevent dehydration. Ask how much liquid to drink each day and which liquids are best for you.    Rest when you need to. Start activity slowly and work up to your usual routine as you start to feel better.    Avoid things that may make hyperemesis worse. Avoid odors, heat, and humidity. Limit noise and flickering lights.    Weigh yourself daily if directed by your healthcare provider. You may need to keep a record of your daily weights for your healthcare provider. He or she may want to make sure you are not losing too much weight.  Follow up with your healthcare provider as directed: Write down your questions so you remember to ask them during your visits.

## 2024-02-29 NOTE — ED PROVIDER NOTE - NSICDXFAMILYHX_GEN_ALL_CORE_FT
FAMILY HISTORY:  Mother  Still living? Unknown  Family history of breast cancer, Age at diagnosis: Age Unknown    Grandparent  Still living? Unknown  Family history of hypertension, Age at diagnosis: Age Unknown

## 2024-02-29 NOTE — ED PROVIDER NOTE - PATIENT PORTAL LINK FT
You can access the FollowMyHealth Patient Portal offered by [facility] by registering at the following website: [email or web address]. By joining [employer]’s FollowMyHealth portal, you will also be able to view your health information using other applications (apps) compatible with our system.

## 2024-03-01 PROBLEM — F41.9 ANXIETY DISORDER, UNSPECIFIED: Chronic | Status: ACTIVE | Noted: 2021-04-29

## 2024-03-01 PROBLEM — D64.9 ANEMIA, UNSPECIFIED: Chronic | Status: ACTIVE | Noted: 2021-04-29

## 2024-03-01 PROBLEM — K21.9 GASTRO-ESOPHAGEAL REFLUX DISEASE WITHOUT ESOPHAGITIS: Chronic | Status: ACTIVE | Noted: 2021-04-29

## 2024-03-01 LAB
ALBUMIN SERPL ELPH-MCNC: 3.8 G/DL — SIGNIFICANT CHANGE UP (ref 3.3–5)
ALP SERPL-CCNC: 47 U/L — SIGNIFICANT CHANGE UP (ref 40–120)
ALT FLD-CCNC: 11 U/L — SIGNIFICANT CHANGE UP (ref 4–33)
ANION GAP SERPL CALC-SCNC: 14 MMOL/L — SIGNIFICANT CHANGE UP (ref 7–14)
AST SERPL-CCNC: 14 U/L — SIGNIFICANT CHANGE UP (ref 4–32)
BASOPHILS # BLD AUTO: 0.06 K/UL — SIGNIFICANT CHANGE UP (ref 0–0.2)
BASOPHILS NFR BLD AUTO: 0.8 % — SIGNIFICANT CHANGE UP (ref 0–2)
BILIRUB SERPL-MCNC: 0.7 MG/DL — SIGNIFICANT CHANGE UP (ref 0.2–1.2)
BUN SERPL-MCNC: 6 MG/DL — LOW (ref 7–23)
CALCIUM SERPL-MCNC: 9.2 MG/DL — SIGNIFICANT CHANGE UP (ref 8.4–10.5)
CHLORIDE SERPL-SCNC: 101 MMOL/L — SIGNIFICANT CHANGE UP (ref 98–107)
CO2 SERPL-SCNC: 19 MMOL/L — LOW (ref 22–31)
CREAT SERPL-MCNC: 0.51 MG/DL — SIGNIFICANT CHANGE UP (ref 0.5–1.3)
EGFR: 132 ML/MIN/1.73M2 — SIGNIFICANT CHANGE UP
EOSINOPHIL # BLD AUTO: 0.08 K/UL — SIGNIFICANT CHANGE UP (ref 0–0.5)
EOSINOPHIL NFR BLD AUTO: 1 % — SIGNIFICANT CHANGE UP (ref 0–6)
GLUCOSE SERPL-MCNC: 88 MG/DL — SIGNIFICANT CHANGE UP (ref 70–99)
HCG SERPL-ACNC: SIGNIFICANT CHANGE UP MIU/ML
HCT VFR BLD CALC: 32 % — LOW (ref 34.5–45)
HGB BLD-MCNC: 10.8 G/DL — LOW (ref 11.5–15.5)
HIV 1+2 AB+HIV1 P24 AG SERPL QL IA: SIGNIFICANT CHANGE UP
IANC: 5.29 K/UL — SIGNIFICANT CHANGE UP (ref 1.8–7.4)
IMM GRANULOCYTES NFR BLD AUTO: 0.6 % — SIGNIFICANT CHANGE UP (ref 0–0.9)
LIDOCAIN IGE QN: 43 U/L — SIGNIFICANT CHANGE UP (ref 7–60)
LYMPHOCYTES # BLD AUTO: 1.8 K/UL — SIGNIFICANT CHANGE UP (ref 1–3.3)
LYMPHOCYTES # BLD AUTO: 22.6 % — SIGNIFICANT CHANGE UP (ref 13–44)
MCHC RBC-ENTMCNC: 29.3 PG — SIGNIFICANT CHANGE UP (ref 27–34)
MCHC RBC-ENTMCNC: 33.8 GM/DL — SIGNIFICANT CHANGE UP (ref 32–36)
MCV RBC AUTO: 86.7 FL — SIGNIFICANT CHANGE UP (ref 80–100)
MONOCYTES # BLD AUTO: 0.67 K/UL — SIGNIFICANT CHANGE UP (ref 0–0.9)
MONOCYTES NFR BLD AUTO: 8.4 % — SIGNIFICANT CHANGE UP (ref 2–14)
NEUTROPHILS # BLD AUTO: 5.29 K/UL — SIGNIFICANT CHANGE UP (ref 1.8–7.4)
NEUTROPHILS NFR BLD AUTO: 66.6 % — SIGNIFICANT CHANGE UP (ref 43–77)
NRBC # BLD: 0 /100 WBCS — SIGNIFICANT CHANGE UP (ref 0–0)
NRBC # FLD: 0 K/UL — SIGNIFICANT CHANGE UP (ref 0–0)
PLATELET # BLD AUTO: 269 K/UL — SIGNIFICANT CHANGE UP (ref 150–400)
POTASSIUM SERPL-MCNC: 3.3 MMOL/L — LOW (ref 3.5–5.3)
POTASSIUM SERPL-SCNC: 3.3 MMOL/L — LOW (ref 3.5–5.3)
PROT SERPL-MCNC: 7.1 G/DL — SIGNIFICANT CHANGE UP (ref 6–8.3)
RBC # BLD: 3.69 M/UL — LOW (ref 3.8–5.2)
RBC # FLD: 14.5 % — SIGNIFICANT CHANGE UP (ref 10.3–14.5)
SODIUM SERPL-SCNC: 134 MMOL/L — LOW (ref 135–145)
WBC # BLD: 7.95 K/UL — SIGNIFICANT CHANGE UP (ref 3.8–10.5)
WBC # FLD AUTO: 7.95 K/UL — SIGNIFICANT CHANGE UP (ref 3.8–10.5)

## 2024-03-01 PROCEDURE — 76801 OB US < 14 WKS SINGLE FETUS: CPT | Mod: 26

## 2024-03-01 RX ORDER — FAMOTIDINE 10 MG/ML
2 INJECTION INTRAVENOUS
Qty: 28 | Refills: 0
Start: 2024-03-01 | End: 2024-03-07

## 2024-03-01 RX ORDER — ONDANSETRON 8 MG/1
1 TABLET, FILM COATED ORAL
Qty: 20 | Refills: 1
Start: 2024-03-01 | End: 2024-03-10

## 2024-03-01 RX ADMIN — SODIUM CHLORIDE 1000 MILLILITER(S): 9 INJECTION INTRAMUSCULAR; INTRAVENOUS; SUBCUTANEOUS at 00:20

## 2024-03-01 RX ADMIN — FAMOTIDINE 20 MILLIGRAM(S): 10 INJECTION INTRAVENOUS at 00:23

## 2024-03-01 NOTE — ED ADULT NURSE NOTE - NSFALLUNIVINTERV_ED_ALL_ED
Bed/Stretcher in lowest position, wheels locked, appropriate side rails in place/Call bell, personal items and telephone in reach/Instruct patient to call for assistance before getting out of bed/chair/stretcher/Non-slip footwear applied when patient is off stretcher/[place] to call system/Physically safe environment - no spills, clutter or unnecessary equipment/Purposeful proactive rounding/Room/bathroom lighting operational, light cord in reach

## 2024-03-01 NOTE — ED ADULT NURSE NOTE - NSFALLASSESSNEED_ED_ALL_ED
Insertion of Nexplanon  Date/Time: 9/4/2019 9:06 AM  Performed by: Kacey Martin MD  Authorized by: Kacey Martin MD     Consent obtained:  Written  Consent given by:  Parent  Patient questions answered: yes    Patient agrees, verbalizes understanding, and wants to proceed: yes    Educational handouts given: yes    Instructions and paperwork completed: yes    Prepped with: povidone-iodine    Local anesthetic:  Lidocaine with epinephrine  The site was cleaned and prepped in a sterile fashion: yes    Small stab incision was made in arm: no    Left/right:  Left   68 mg etonogestrel 68 mg  Preloaded Implanon trocar was placed subdermally: yes    Visualization of implant was obtained: yes    Nexplanon was inserted and trocar removed: yes    Visualization of notch in stilette and palpitation of device: yes    Palpitation confirms placement by provider and patient: yes    Site was closed with steri-strips and pressure bandage applied: yes        
no

## 2024-03-01 NOTE — ED ADULT NURSE NOTE - OBJECTIVE STATEMENT
Pt received to intake 6, A&O x 4, ambulatory, currently 10 weeks pregnant, coming to ED with complaints of nausea and vomiting. Pt reports onset of symptoms 4 weeks ago, reports multiple episodes of vomiting a day, unable to keep down po intake. Pt reports worsening of symptoms over past week, prescribed medicated by OB with little to no relief. Pt denies HA, double/blurry vision, chest pain, SOB, diarrhea, numbness/tingling, urinary abnormalities, weakness. 20G IV placed to L AC, labs drawn and sent, medicated as per EMAR, RR equal and unlabored, safety maintained, comfort provided, awaiting results at this time.

## 2024-03-19 ENCOUNTER — ASOB RESULT (OUTPATIENT)
Age: 29
End: 2024-03-19

## 2024-03-19 ENCOUNTER — LABORATORY RESULT (OUTPATIENT)
Age: 29
End: 2024-03-19

## 2024-03-19 ENCOUNTER — APPOINTMENT (OUTPATIENT)
Dept: ANTEPARTUM | Facility: CLINIC | Age: 29
End: 2024-03-19
Payer: COMMERCIAL

## 2024-03-19 PROCEDURE — 76801 OB US < 14 WKS SINGLE FETUS: CPT

## 2024-03-19 PROCEDURE — 76813 OB US NUCHAL MEAS 1 GEST: CPT

## 2024-03-19 PROCEDURE — 36415 COLL VENOUS BLD VENIPUNCTURE: CPT

## 2024-04-18 ENCOUNTER — EMERGENCY (EMERGENCY)
Facility: HOSPITAL | Age: 29
LOS: 1 days | Discharge: HOME | End: 2024-04-18
Attending: EMERGENCY MEDICINE | Admitting: EMERGENCY MEDICINE
Payer: COMMERCIAL

## 2024-04-18 VITALS
HEART RATE: 81 BPM | DIASTOLIC BLOOD PRESSURE: 79 MMHG | RESPIRATION RATE: 17 BRPM | OXYGEN SATURATION: 100 % | TEMPERATURE: 98 F | SYSTOLIC BLOOD PRESSURE: 129 MMHG

## 2024-04-18 VITALS
RESPIRATION RATE: 16 BRPM | HEART RATE: 89 BPM | TEMPERATURE: 98 F | SYSTOLIC BLOOD PRESSURE: 127 MMHG | DIASTOLIC BLOOD PRESSURE: 83 MMHG | OXYGEN SATURATION: 98 %

## 2024-04-18 DIAGNOSIS — Z98.890 OTHER SPECIFIED POSTPROCEDURAL STATES: Chronic | ICD-10-CM

## 2024-04-18 LAB
ALBUMIN SERPL ELPH-MCNC: 3.9 G/DL — SIGNIFICANT CHANGE UP (ref 3.3–5)
ALP SERPL-CCNC: 52 U/L — SIGNIFICANT CHANGE UP (ref 40–120)
ALT FLD-CCNC: 12 U/L — SIGNIFICANT CHANGE UP (ref 4–33)
ANION GAP SERPL CALC-SCNC: 12 MMOL/L — SIGNIFICANT CHANGE UP (ref 7–14)
APPEARANCE UR: CLEAR — SIGNIFICANT CHANGE UP
AST SERPL-CCNC: 15 U/L — SIGNIFICANT CHANGE UP (ref 4–32)
BACTERIA # UR AUTO: ABNORMAL /HPF
BASOPHILS # BLD AUTO: 0.06 K/UL — SIGNIFICANT CHANGE UP (ref 0–0.2)
BASOPHILS NFR BLD AUTO: 0.7 % — SIGNIFICANT CHANGE UP (ref 0–2)
BILIRUB SERPL-MCNC: 0.4 MG/DL — SIGNIFICANT CHANGE UP (ref 0.2–1.2)
BILIRUB UR-MCNC: NEGATIVE — SIGNIFICANT CHANGE UP
BUN SERPL-MCNC: 6 MG/DL — LOW (ref 7–23)
CALCIUM SERPL-MCNC: 9.1 MG/DL — SIGNIFICANT CHANGE UP (ref 8.4–10.5)
CAST: 0 /LPF — SIGNIFICANT CHANGE UP (ref 0–4)
CHLORIDE SERPL-SCNC: 106 MMOL/L — SIGNIFICANT CHANGE UP (ref 98–107)
CO2 SERPL-SCNC: 19 MMOL/L — LOW (ref 22–31)
COLOR SPEC: YELLOW — SIGNIFICANT CHANGE UP
CREAT SERPL-MCNC: 0.51 MG/DL — SIGNIFICANT CHANGE UP (ref 0.5–1.3)
DIFF PNL FLD: NEGATIVE — SIGNIFICANT CHANGE UP
EGFR: 131 ML/MIN/1.73M2 — SIGNIFICANT CHANGE UP
EOSINOPHIL # BLD AUTO: 0.05 K/UL — SIGNIFICANT CHANGE UP (ref 0–0.5)
EOSINOPHIL NFR BLD AUTO: 0.6 % — SIGNIFICANT CHANGE UP (ref 0–6)
GLUCOSE SERPL-MCNC: 79 MG/DL — SIGNIFICANT CHANGE UP (ref 70–99)
GLUCOSE UR QL: NEGATIVE MG/DL — SIGNIFICANT CHANGE UP
HCT VFR BLD CALC: 32.4 % — LOW (ref 34.5–45)
HGB BLD-MCNC: 10.9 G/DL — LOW (ref 11.5–15.5)
IANC: 6.36 K/UL — SIGNIFICANT CHANGE UP (ref 1.8–7.4)
IMM GRANULOCYTES NFR BLD AUTO: 0.8 % — SIGNIFICANT CHANGE UP (ref 0–0.9)
KETONES UR-MCNC: NEGATIVE MG/DL — SIGNIFICANT CHANGE UP
LEUKOCYTE ESTERASE UR-ACNC: NEGATIVE — SIGNIFICANT CHANGE UP
LYMPHOCYTES # BLD AUTO: 1.35 K/UL — SIGNIFICANT CHANGE UP (ref 1–3.3)
LYMPHOCYTES # BLD AUTO: 15.7 % — SIGNIFICANT CHANGE UP (ref 13–44)
MAGNESIUM SERPL-MCNC: 1.9 MG/DL — SIGNIFICANT CHANGE UP (ref 1.6–2.6)
MCHC RBC-ENTMCNC: 29.4 PG — SIGNIFICANT CHANGE UP (ref 27–34)
MCHC RBC-ENTMCNC: 33.6 GM/DL — SIGNIFICANT CHANGE UP (ref 32–36)
MCV RBC AUTO: 87.3 FL — SIGNIFICANT CHANGE UP (ref 80–100)
MONOCYTES # BLD AUTO: 0.71 K/UL — SIGNIFICANT CHANGE UP (ref 0–0.9)
MONOCYTES NFR BLD AUTO: 8.3 % — SIGNIFICANT CHANGE UP (ref 2–14)
NEUTROPHILS # BLD AUTO: 6.36 K/UL — SIGNIFICANT CHANGE UP (ref 1.8–7.4)
NEUTROPHILS NFR BLD AUTO: 73.9 % — SIGNIFICANT CHANGE UP (ref 43–77)
NITRITE UR-MCNC: NEGATIVE — SIGNIFICANT CHANGE UP
NRBC # BLD: 0 /100 WBCS — SIGNIFICANT CHANGE UP (ref 0–0)
NRBC # FLD: 0 K/UL — SIGNIFICANT CHANGE UP (ref 0–0)
PH UR: 7 — SIGNIFICANT CHANGE UP (ref 5–8)
PHOSPHATE SERPL-MCNC: 2.7 MG/DL — SIGNIFICANT CHANGE UP (ref 2.5–4.5)
PLATELET # BLD AUTO: 254 K/UL — SIGNIFICANT CHANGE UP (ref 150–400)
POTASSIUM SERPL-MCNC: 3.7 MMOL/L — SIGNIFICANT CHANGE UP (ref 3.5–5.3)
POTASSIUM SERPL-SCNC: 3.7 MMOL/L — SIGNIFICANT CHANGE UP (ref 3.5–5.3)
PROT SERPL-MCNC: 7.2 G/DL — SIGNIFICANT CHANGE UP (ref 6–8.3)
PROT UR-MCNC: NEGATIVE MG/DL — SIGNIFICANT CHANGE UP
RBC # BLD: 3.71 M/UL — LOW (ref 3.8–5.2)
RBC # FLD: 14.3 % — SIGNIFICANT CHANGE UP (ref 10.3–14.5)
RBC CASTS # UR COMP ASSIST: 1 /HPF — SIGNIFICANT CHANGE UP (ref 0–4)
SODIUM SERPL-SCNC: 137 MMOL/L — SIGNIFICANT CHANGE UP (ref 135–145)
SP GR SPEC: 1.01 — SIGNIFICANT CHANGE UP (ref 1–1.03)
SQUAMOUS # UR AUTO: 3 /HPF — SIGNIFICANT CHANGE UP (ref 0–5)
UROBILINOGEN FLD QL: 0.2 MG/DL — SIGNIFICANT CHANGE UP (ref 0.2–1)
WBC # BLD: 8.6 K/UL — SIGNIFICANT CHANGE UP (ref 3.8–10.5)
WBC # FLD AUTO: 8.6 K/UL — SIGNIFICANT CHANGE UP (ref 3.8–10.5)
WBC UR QL: 2 /HPF — SIGNIFICANT CHANGE UP (ref 0–5)

## 2024-04-18 PROCEDURE — 93010 ELECTROCARDIOGRAM REPORT: CPT

## 2024-04-18 PROCEDURE — 99284 EMERGENCY DEPT VISIT MOD MDM: CPT

## 2024-04-18 RX ORDER — SODIUM CHLORIDE 9 MG/ML
1500 INJECTION INTRAMUSCULAR; INTRAVENOUS; SUBCUTANEOUS ONCE
Refills: 0 | Status: COMPLETED | OUTPATIENT
Start: 2024-04-18 | End: 2024-04-18

## 2024-04-18 RX ADMIN — SODIUM CHLORIDE 1500 MILLILITER(S): 9 INJECTION INTRAMUSCULAR; INTRAVENOUS; SUBCUTANEOUS at 22:09

## 2024-04-18 NOTE — ED ADULT NURSE NOTE - OBJECTIVE STATEMENT
Patient received to intake rm 8, A&Ox4. Pt 16 weeks pregnant, LMP 12/22. Pt endorsing intermittent lightheadedness, reports near syncopal episode while shopping today. Pt denies SOB, headache, blurry vision, chest pain, N/V/D, vaginal bleeding, weakness. Respirations even and unlabored. Orthostatic VS as noted. 20G IV established to left ac, labs collected and sent. Medicated per MAR. safety maintained, call bell in reach. pending US

## 2024-04-18 NOTE — ED PROVIDER NOTE - PATIENT PORTAL LINK FT
You can access the FollowMyHealth Patient Portal offered by [facility] by registering at the following website: [email or web address]. By joining [employer]’s FollowMyHealth portal, you will also be able to view your health information using other applications (apps) compatible with our system. Doxycycline Pregnancy And Lactation Text: This medication is Pregnancy Category D and not consider safe during pregnancy. It is also excreted in breast milk but is considered safe for shorter treatment courses.

## 2024-04-18 NOTE — ED ADULT TRIAGE NOTE - CHIEF COMPLAINT QUOTE
Patient 16 weeks pregnant, EFRAIN 9/27, LMP 12/22, reports near-syncopal episode while shopping today. Reports lightheadedness, patient appears pale. Denies chest pain, SOB, nausea, vomiting, cramping, bleeding. Denies phx

## 2024-04-18 NOTE — ED PROVIDER NOTE - OBJECTIVE STATEMENT
29-year-old female with no pertinent past medical history who is approximately 16 weeks pregnant G1, P0 presents for dizziness described as lightheadedness feeling as if she is going to faint.  Patient states that she had a bagel, hash browns, some water, and coffee today and then proceeded to walk around a shopping center for about 10 minutes before she felt like she was going to pass out.  Patient denies associated chest pain, shortness of breath, abdominal pain, nausea, vomiting, diarrhea, vaginal bleeding/discharge, pelvic pain/cramping.  Patient does endorse feeling palpitations described as her heart racing.  No fever or chills, cough, nasal congestion, sore throat.  No other voiced complaints.

## 2024-04-18 NOTE — ED PROVIDER NOTE - NSFOLLOWUPINSTRUCTIONS_ED_ALL_ED_FT
Advance activity as tolerated.  Continue prenatals as directed unless otherwise instructed.  Drink plenty of fluids. Follow up with your OB in 48-72 hours- bring copies of your results.  Return to the ER for worsening or persistent symptoms, and/or ANY NEW OR CONCERNING SYMPTOMS. If you have issues obtaining follow up, please call: [phone] to obtain a doctor or specialist who takes your insurance in your area.  You may call [phone] to make an appointment with the internal medicine clinic.

## 2024-04-18 NOTE — ED PROVIDER NOTE - CLINICAL SUMMARY MEDICAL DECISION MAKING FREE TEXT BOX
29-year-old female with no pertinent past medical history who is approximately 16 weeks pregnant G1, P0 presents for dizziness described as lightheadedness feeling as if she is going to faint.  Patient states that she had a bagel, hash browns, some water, and coffee today and then proceeded to walk around a shopping center for about 10 minutes before she felt like she was going to pass out.  Patient denies associated chest pain, shortness of breath, abdominal pain, nausea, vomiting, diarrhea, vaginal bleeding/discharge, pelvic pain/cramping.  Patient does endorse feeling palpitations described as her heart racing.  No fever or chills, cough, nasal congestion, sore throat.  No other voiced complaints.  VSS. Exam as noted above. DDX to consider but not limited to: dehydration, arrhythmia, metabolic derangements, OB-related emergencies (previa, abruption, fetal demise). Will obtain labs and transabdominal US to assess. Pt is well appearing with dry mucous membranes, ambulatory, less likely OB related emergency.  EKG with sinus tachy, no ST/T changes. Follow progress notes to dispo.

## 2024-04-18 NOTE — ED PROVIDER NOTE - PHYSICAL EXAMINATION
CONSTITUTIONAL: Well-appearing; well-nourished; in no apparent distress. Non-toxic appearing.   NEURO: Alert & oriented. Gait steady without assistance. Sensory and motor functions are grossly intact.  PSYCH: Mood appropriate. Thought processes intact.   MOUTH: Dry mucous membranes, cracked lips.   NECK: Supple  CARD: Tachycardic but regular rhythm, no murmurs  RESP: No accessory muscle use; breath sounds clear and equal bilaterally; no wheezes, rhonchi, or rales     ABD: Gravid uterus c/w 16 weeks. Soft; non-distended; non-tender. No guarding or rebound.   MUSCULOSKELETAL/EXTREMITIES: FROM in all four extremities; no extremity edema.  SKIN: Warm; dry; no apparent lesions or exudate

## 2024-04-19 PROCEDURE — 76815 OB US LIMITED FETUS(S): CPT | Mod: 26

## 2024-04-20 LAB
CULTURE RESULTS: SIGNIFICANT CHANGE UP
SPECIMEN SOURCE: SIGNIFICANT CHANGE UP

## 2024-05-07 ENCOUNTER — ASOB RESULT (OUTPATIENT)
Age: 29
End: 2024-05-07

## 2024-05-07 ENCOUNTER — APPOINTMENT (OUTPATIENT)
Dept: ANTEPARTUM | Facility: CLINIC | Age: 29
End: 2024-05-07
Payer: COMMERCIAL

## 2024-05-07 PROCEDURE — 76805 OB US >/= 14 WKS SNGL FETUS: CPT
